# Patient Record
Sex: FEMALE | Race: WHITE | NOT HISPANIC OR LATINO | Employment: OTHER | URBAN - NONMETROPOLITAN AREA
[De-identification: names, ages, dates, MRNs, and addresses within clinical notes are randomized per-mention and may not be internally consistent; named-entity substitution may affect disease eponyms.]

---

## 2017-02-09 ENCOUNTER — OFFICE VISIT (OUTPATIENT)
Dept: OPHTHALMOLOGY | Facility: CLINIC | Age: 72
End: 2017-02-09

## 2017-02-09 DIAGNOSIS — H04.129 DRY EYE: Primary | ICD-10-CM

## 2017-02-09 DIAGNOSIS — Z96.1 PSEUDOPHAKIA: ICD-10-CM

## 2017-02-09 PROCEDURE — 99213 OFFICE O/P EST LOW 20 MIN: CPT | Performed by: OPHTHALMOLOGY

## 2017-02-09 NOTE — PROGRESS NOTES
Jorge Fraser is a 71 y.o. female.   Chief Complaint   Patient presents with   • Dry Eye   • Artificial Lens Present     bilateral       HPI     Dry Eye   In both eyes.  Associated signs and symptoms include eye pain.  Duration of years.  Treatments tried include artificial tears.           Artificial Lens Present    Additional comments: bilateral           Comments   3 month for refraction       Last edited by Jerman Dobbs MD on 2/9/2017  3:39 PM. (History)          Review of Systems   Eyes: Positive for visual disturbance. Negative for pain.       Objective   Visual Acuity (Snellen - Linear)      Right Left   Dist sc 20/50 20/30   Near sc J1 J1            Wearing Rx      Sphere Cylinder Axis Add   Right -1.50 +1.25 003 +2.50   Left -1.50 +0.75 022 2.50           Manifest Refraction      Sphere Cylinder Axis Add   Right -1.50 +1.50 015 +2.50   Left -2.00 +1.00 025 +2.50       Comments:  R 20/30+ L 20/25-            Pupils      Pupils   Right PERRL   Left PERRL           Confrontational Visual Fields     Visual Fields      Left Right   Result Full Full                  Extraocular Movement      Right Left   Result Full, Ortho Full, Ortho              Tonometry (Applanation, 3:42 PM)      Right Left   Pressure 15 15              Main Ophthalmology Exam     External Exam      Right Left    External Normal Normal      Slit Lamp Exam      Right Left    Lids/Lashes Normal Normal    Conjunctiva/Sclera White and quiet White and quiet    Cornea Clear Clear    Anterior Chamber Deep and quiet Deep and quiet    Iris Round and reactive Round and reactive    Vitreous Normal Normal      Fundus Exam      Right Left    Disc Normal Normal    Macula Normal Normal    Vessels Normal Normal    Periphery Normal Normal                Assessment/Plan   Diagnoses and all orders for this visit:    Dry eye    Pseudophakia                Return in about 1 year (around 2/9/2018) for Dilated exam.

## 2023-07-31 ENCOUNTER — PREP FOR SURGERY (OUTPATIENT)
Dept: OTHER | Facility: HOSPITAL | Age: 78
End: 2023-07-31
Payer: MEDICARE

## 2023-07-31 DIAGNOSIS — K92.2 GASTROINTESTINAL BLEEDING: Primary | ICD-10-CM

## 2023-07-31 RX ORDER — SODIUM CHLORIDE 9 MG/ML
40 INJECTION, SOLUTION INTRAVENOUS AS NEEDED
OUTPATIENT
Start: 2023-08-08

## 2023-07-31 RX ORDER — DEXTROSE AND SODIUM CHLORIDE 5; .45 G/100ML; G/100ML
30 INJECTION, SOLUTION INTRAVENOUS CONTINUOUS PRN
OUTPATIENT
Start: 2023-08-08

## 2023-08-04 RX ORDER — ERGOCALCIFEROL 1.25 MG/1
50000 CAPSULE ORAL WEEKLY
COMMUNITY

## 2023-08-04 RX ORDER — MULTIVIT WITH MINERALS/LUTEIN
500 TABLET ORAL DAILY
COMMUNITY

## 2023-08-04 RX ORDER — FERROUS SULFATE 325(65) MG
325 TABLET ORAL NIGHTLY
COMMUNITY

## 2023-08-04 RX ORDER — CALCIUM CARBONATE/VITAMIN D3 500 MG-10
1 TABLET ORAL DAILY
COMMUNITY

## 2023-08-08 ENCOUNTER — ANESTHESIA (OUTPATIENT)
Dept: GASTROENTEROLOGY | Facility: HOSPITAL | Age: 78
End: 2023-08-08
Payer: MEDICARE

## 2023-08-08 ENCOUNTER — APPOINTMENT (OUTPATIENT)
Dept: GENERAL RADIOLOGY | Facility: HOSPITAL | Age: 78
End: 2023-08-08
Payer: MEDICARE

## 2023-08-08 ENCOUNTER — HOSPITAL ENCOUNTER (OUTPATIENT)
Facility: HOSPITAL | Age: 78
Setting detail: HOSPITAL OUTPATIENT SURGERY
Discharge: HOME | End: 2023-08-08
Attending: INTERNAL MEDICINE | Admitting: INTERNAL MEDICINE
Payer: MEDICARE

## 2023-08-08 ENCOUNTER — ANESTHESIA EVENT (OUTPATIENT)
Dept: GASTROENTEROLOGY | Facility: HOSPITAL | Age: 78
End: 2023-08-08
Payer: MEDICARE

## 2023-08-08 VITALS
TEMPERATURE: 96.7 F | WEIGHT: 131.2 LBS | HEIGHT: 63 IN | OXYGEN SATURATION: 100 % | SYSTOLIC BLOOD PRESSURE: 155 MMHG | HEART RATE: 59 BPM | DIASTOLIC BLOOD PRESSURE: 70 MMHG | RESPIRATION RATE: 18 BRPM | BODY MASS INDEX: 23.25 KG/M2

## 2023-08-08 DIAGNOSIS — K92.2 GASTROINTESTINAL BLEEDING: ICD-10-CM

## 2023-08-08 PROCEDURE — 25010000002 PROPOFOL 10 MG/ML EMULSION: Performed by: NURSE ANESTHETIST, CERTIFIED REGISTERED

## 2023-08-08 PROCEDURE — 74018 RADEX ABDOMEN 1 VIEW: CPT

## 2023-08-08 RX ORDER — DEXTROSE AND SODIUM CHLORIDE 5; .45 G/100ML; G/100ML
INJECTION, SOLUTION INTRAVENOUS CONTINUOUS PRN
Status: DISCONTINUED | OUTPATIENT
Start: 2023-08-08 | End: 2023-08-08 | Stop reason: SURG

## 2023-08-08 RX ORDER — ONDANSETRON 2 MG/ML
4 INJECTION INTRAMUSCULAR; INTRAVENOUS ONCE AS NEEDED
Status: DISCONTINUED | OUTPATIENT
Start: 2023-08-08 | End: 2023-08-08 | Stop reason: HOSPADM

## 2023-08-08 RX ORDER — SODIUM CHLORIDE 9 MG/ML
40 INJECTION, SOLUTION INTRAVENOUS AS NEEDED
Status: DISCONTINUED | OUTPATIENT
Start: 2023-08-08 | End: 2023-08-08 | Stop reason: HOSPADM

## 2023-08-08 RX ORDER — PROMETHAZINE HYDROCHLORIDE 25 MG/1
25 SUPPOSITORY RECTAL ONCE AS NEEDED
Status: DISCONTINUED | OUTPATIENT
Start: 2023-08-08 | End: 2023-08-08 | Stop reason: HOSPADM

## 2023-08-08 RX ORDER — PROPOFOL 10 MG/ML
VIAL (ML) INTRAVENOUS AS NEEDED
Status: DISCONTINUED | OUTPATIENT
Start: 2023-08-08 | End: 2023-08-08 | Stop reason: SURG

## 2023-08-08 RX ORDER — PROMETHAZINE HYDROCHLORIDE 25 MG/1
25 TABLET ORAL ONCE AS NEEDED
Status: DISCONTINUED | OUTPATIENT
Start: 2023-08-08 | End: 2023-08-08 | Stop reason: HOSPADM

## 2023-08-08 RX ORDER — DEXTROSE AND SODIUM CHLORIDE 5; .45 G/100ML; G/100ML
30 INJECTION, SOLUTION INTRAVENOUS CONTINUOUS PRN
Status: DISCONTINUED | OUTPATIENT
Start: 2023-08-08 | End: 2023-08-08 | Stop reason: HOSPADM

## 2023-08-08 RX ORDER — LIDOCAINE HYDROCHLORIDE 20 MG/ML
INJECTION, SOLUTION INTRAVENOUS AS NEEDED
Status: DISCONTINUED | OUTPATIENT
Start: 2023-08-08 | End: 2023-08-08 | Stop reason: SURG

## 2023-08-08 RX ADMIN — PROPOFOL 10 MG: 10 INJECTION, EMULSION INTRAVENOUS at 10:04

## 2023-08-08 RX ADMIN — PROPOFOL 20 MG: 10 INJECTION, EMULSION INTRAVENOUS at 09:47

## 2023-08-08 RX ADMIN — DEXTROSE AND SODIUM CHLORIDE: 5; 450 INJECTION, SOLUTION INTRAVENOUS at 09:30

## 2023-08-08 RX ADMIN — PROPOFOL 20 MG: 10 INJECTION, EMULSION INTRAVENOUS at 09:43

## 2023-08-08 RX ADMIN — DEXTROSE AND SODIUM CHLORIDE 30 ML/HR: 5; 450 INJECTION, SOLUTION INTRAVENOUS at 09:35

## 2023-08-08 RX ADMIN — LIDOCAINE HYDROCHLORIDE 50 MG: 20 INJECTION, SOLUTION INTRAVENOUS at 09:40

## 2023-08-08 RX ADMIN — PROPOFOL 10 MG: 10 INJECTION, EMULSION INTRAVENOUS at 09:59

## 2023-08-08 RX ADMIN — PROPOFOL 20 MG: 10 INJECTION, EMULSION INTRAVENOUS at 09:51

## 2023-08-08 RX ADMIN — PROPOFOL 60 MG: 10 INJECTION, EMULSION INTRAVENOUS at 09:40

## 2023-08-08 RX ADMIN — PROPOFOL 20 MG: 10 INJECTION, EMULSION INTRAVENOUS at 09:54

## 2023-08-08 NOTE — ANESTHESIA PREPROCEDURE EVALUATION
Anesthesia Evaluation     NPO Solid Status: > 8 hours  NPO Liquid Status: > 2 hours           Airway   Mallampati: III  TM distance: >3 FB  Neck ROM: full  Possible difficult intubation  Dental    (+) upper dentures and partials    Pulmonary     breath sounds clear to auscultation  Cardiovascular     Rhythm: regular  Rate: normal        Neuro/Psych  GI/Hepatic/Renal/Endo    (+) GI bleeding resolved    Musculoskeletal     Abdominal    Substance History      OB/GYN          Other                        Anesthesia Plan    ASA 2     MAC   total IV anesthesia  intravenous induction     Anesthetic plan, risks, benefits, and alternatives have been provided, discussed and informed consent has been obtained with: patient.      CODE STATUS:

## 2023-08-08 NOTE — ANESTHESIA POSTPROCEDURE EVALUATION
"Patient: Tim Fraser    Procedure Summary       Date: 08/08/23 Room / Location: [facility] ENDOSCOPY 2 / [facility] ENDOSCOPY    Anesthesia Start: 0935 Anesthesia Stop: 1015    Procedures:       ESOPHAGOGASTRODUODENOSCOPY 10:00      COLONOSCOPY 10:00 Diagnosis:       Gastrointestinal bleeding      (Gastrointestinal bleeding [K92.2])    Surgeons: Sigifredo Zapata DO Provider: Rachell Roche CRNA    Anesthesia Type: MAC ASA Status: 2            Anesthesia Type: MAC    Vitals  No vitals data found for the desired time range.          Post Anesthesia Care and Evaluation    Patient location during evaluation: bedside  Patient participation: complete - patient participated  Level of consciousness: awake  Pain score: 0  Pain management: adequate    Airway patency: patent  Anesthetic complications: No anesthetic complications  PONV Status: none  Cardiovascular status: acceptable  Respiratory status: acceptable  Hydration status: acceptable    Comments: /66 (Patient Position: Sitting)   Pulse 67   Temp 97.6 øF (36.4 øC) (Temporal)   Resp 18   Ht 160 cm (63\")   Wt 59.5 kg (131 lb 3.2 oz)   SpO2 100%   BMI 23.24 kg/mý       "

## 2023-08-08 NOTE — H&P
Isa Kimble DO,[facility]  Gastroenterology  Hepatology  Endoscopy  Board Certified in Internal Medicine and gastroenterology  [address], [address]  [place]. [address]  - [phone]   F - [phone]     GASTROENTEROLOGY HISTORY AND PHYSICAL  NOTE   ISA KIMBLE DO.         SUBJECTIVE:   8/8/2023    Name: Tim Fraser  DOD: 1945        Chief Complaint:         Subjective: Occult blood in the stools with chronic anemia.    Patient is 78 y.o. female presents with desire for elective EGD with biopsy as well as colonoscopy.      ROS/HISTORY/ CURRENT MEDICATIONS/OBJECTIVE/VS/PE:   Review of Systems:  All systems unremarkable unless specified below.  Constitutional   HENT  Eyes   Respiratory    Cardiovascular  Gastrointestinal   Endocrine  Genitourinary    Musculoskeletal   Skin  Allergic/Immunologic    Neurological    Hematological  Psychiatric/Behavioral    History:     Past Medical History:   Diagnosis Date    Artificial lens present     in position - both    Cataract     Conjunctivitis     OTHER - RESOLVED    Keratoconjunctivitis sicca     Pain in eye     Presbyopia     Vision disturbance     OTHER, PHOTPSIA    Visual distortions of shape and size     Vitreous detachment of left eye      Past Surgical History:   Procedure Laterality Date    CATARACT EXTRACTION      Remove cataract, insert lens (Cataract extraction with intraocular lens implantation of the right eye)    EYE SURGERY  11/15/2010    AFTER CATARACT LASER SURGERY 52080 (YAG laser capsulotomy, left eye.)     Family History   Problem Relation Age of Onset    Coronary artery disease Other     Heart disease Other     Stroke Other      Social History     Tobacco Use    Smoking status: Never    Smokeless tobacco: Never   Vaping Use    Vaping Use: Never used   Substance Use Topics    Alcohol use: No    Drug use: No     Prior to Admission medications    Medication Sig Start Date End Date Taking? Authorizing Provider   ATENOLOL PO  Take 25 mg by mouth Daily. 1/2 tab in am & 1/2 tab in pm 8/12/16  Yes Shereen Degroot MD   Calcium Carb-Cholecalciferol (Oyster Shell Calcium/D) 500-10 MG-MCG tablet tablet Take 1 tablet by mouth Daily.   Yes Shereen Degroot MD   Cyanocobalamin (VITAMIN B 12 PO) Take 1,000 mg by mouth Daily. SL   Yes Shereen Degroot MD   ferrous sulfate 325 (65 FE) MG tablet Take 1 tablet by mouth Every Night.   Yes Shereen Degroot MD   OMEPRAZOLE PO Take 20 mg by mouth Daily. 8/12/16  Yes Shereen Degroot MD   polyethyl glycol-propyl glycol (SYSTANE) 0.4-0.3 % solution ophthalmic solution (artificial tears) Administer 1 drop to both eyes Daily As Needed.   Yes Shereen Degroot MD   vitamin C (ASCORBIC ACID) 250 MG tablet Take 2 tablets by mouth Daily.   Yes Provider, Historical, MD   vitamin D (ERGOCALCIFEROL) 1.25 MG (34445 UT) capsule capsule Take 1 capsule by mouth 1 (One) Time Per Week.   Yes Shereen Degroot MD     Allergies:  Codeine, Lisinopril, Doxycycline, and Latex    I have reviewed the patients medical history, surgical history and family history in the available medical record system.     Current Medications:     Current Facility-Administered Medications   Medication Dose Route Frequency Provider Last Rate Last Admin    dextrose 5 % and sodium chloride 0.45 % infusion  30 mL/hr Intravenous Continuous PRN Sigifredo Zapata DO        sodium chloride 0.9 % infusion 40 mL  40 mL Intravenous PRN Sigifredo Zapata DO           Objective     Physical Exam:   Temp:  [97.6 øF (36.4 øC)] 97.6 øF (36.4 øC)  Heart Rate:  [67] 67  Resp:  [18] 18  BP: (195)/(84) 195/84    Physical Exam:  General Appearance:    Alert, cooperative, in no acute distress   Head:    Normocephalic, without obvious abnormality, atraumatic   Eyes:            Lids and lashes normal, conjunctivae and sclerae normal, no icterus, no pallor, corneas clear, PERRLA   Ears:    Ears appear intact with no abnormalities noted    Throat:   No oral lesions, no thrush, oral mucosa moist   Neck:   No adenopathy, supple, trachea midline, no thyromegaly, no  carotid bruit, no JVD   Back:     No kyphosis present, no scoliosis present, no skin lesions,   erythema or scars, no tenderness to percussion or                 palpation,  range of motion normal   Lungs:     Clear to auscultation,respirations regular, even and         unlabored    Heart:    Regular rhythm and normal rate, normal S1 and S2, no  murmur, no gallop, no rub, no click   Breast Exam:    Deferred   Abdomen:     Normal bowel sounds, no masses, no organomegaly, soft  nontender, nondistended, no guarding, no rebound                 tenderness   Genitalia:    Deferred   Extremities:   Moves all extremities well, no edema, no cyanosis, no          redness   Pulses:   Pulses palpable and equal bilaterally   Skin:   No bleeding, bruising or rash   Lymph nodes:   No palpable adenopathy   Neurologic:   Cranial nerves 2 - 12 grossly intact, sensation intact, DTR     present and equal bilaterally      Results Review:     Lab Results   Component Value Date    WBC 4.6 08/14/2019    WBC 5.5 09/17/2018    WBC 5.0 03/07/2018    HGB 13.6 08/14/2019    HGB 13.2 09/17/2018    HGB 14.4 03/07/2018    HCT 42.3 08/14/2019    HCT 40.7 09/17/2018    HCT 44.0 (H) 03/07/2018     08/14/2019     09/17/2018     03/07/2018             No results found for: LIPASE  No results found for: INR  No results found for: THROATCX    Radiology Review:  Imaging Results (Last 72 Hours)       ** No results found for the last 72 hours. **             I reviewed the patient's new clinical results.  I reviewed the patient's new imaging results and agree with the interpretation.     ASSESSMENT/PLAN:   ASSESSMENT:  1.  Occult blood in the stools  2.  Iron deficiency anemia  3.  Gastrointestinal bleeding    PLAN:  1.  Esophagogastroduodenoscopy with biopsy as well as colonoscopy    Risk and benefits  associated with the procedure are reviewed with the patient.  The patient wishes to proceed     Sigifredo Zapata DO  08/08/23  09:29 CDT

## 2023-08-09 LAB — REF LAB TEST METHOD: NORMAL

## 2023-08-16 RX ORDER — ATENOLOL 25 MG/1
0.5 TABLET ORAL 2 TIMES DAILY
COMMUNITY
Start: 2023-08-03

## 2023-08-16 RX ORDER — OMEPRAZOLE 20 MG/1
20 CAPSULE, DELAYED RELEASE ORAL DAILY
COMMUNITY
Start: 2023-08-03

## 2023-08-18 ENCOUNTER — OFFICE VISIT (OUTPATIENT)
Dept: SURGERY | Facility: CLINIC | Age: 78
End: 2023-08-18
Payer: MEDICARE

## 2023-08-18 VITALS
WEIGHT: 132 LBS | BODY MASS INDEX: 23.39 KG/M2 | HEART RATE: 69 BPM | HEIGHT: 63 IN | SYSTOLIC BLOOD PRESSURE: 134 MMHG | TEMPERATURE: 96.8 F | DIASTOLIC BLOOD PRESSURE: 72 MMHG

## 2023-08-18 DIAGNOSIS — C18.4 MALIGNANT NEOPLASM OF TRANSVERSE COLON: Primary | ICD-10-CM

## 2023-08-18 PROCEDURE — 1160F RVW MEDS BY RX/DR IN RCRD: CPT | Performed by: SURGERY

## 2023-08-18 PROCEDURE — 1159F MED LIST DOCD IN RCRD: CPT | Performed by: SURGERY

## 2023-08-18 PROCEDURE — 99204 OFFICE O/P NEW MOD 45 MIN: CPT | Performed by: SURGERY

## 2023-08-18 RX ORDER — SCOLOPAMINE TRANSDERMAL SYSTEM 1 MG/1
1 PATCH, EXTENDED RELEASE TRANSDERMAL CONTINUOUS
Status: CANCELLED | OUTPATIENT
Start: 2023-08-22 | End: 2023-08-25

## 2023-08-18 RX ORDER — ACETAMINOPHEN 500 MG
1000 TABLET ORAL EVERY 6 HOURS
Status: CANCELLED | OUTPATIENT
Start: 2023-08-22

## 2023-08-18 RX ORDER — MELOXICAM 15 MG/1
15 TABLET ORAL ONCE
Status: CANCELLED | OUTPATIENT
Start: 2023-08-22 | End: 2023-08-18

## 2023-08-18 RX ORDER — CHLORHEXIDINE GLUCONATE 4 G/100ML
SOLUTION TOPICAL 2 TIMES DAILY
Qty: 236 ML | Refills: 0 | Status: ON HOLD | OUTPATIENT
Start: 2023-08-18 | End: 2023-08-22

## 2023-08-18 RX ORDER — ALVIMOPAN 12 MG/1
12 CAPSULE ORAL ONCE
Status: CANCELLED | OUTPATIENT
Start: 2023-08-22 | End: 2023-08-25

## 2023-08-18 RX ORDER — GABAPENTIN 400 MG/1
400 CAPSULE ORAL ONCE
Status: CANCELLED | OUTPATIENT
Start: 2023-08-22

## 2023-08-18 NOTE — PROGRESS NOTES
Jorge Fraser is a 78 y.o. female     Chief Complaint: Distal transverse colon cancer    History of Present Illness referred by Dr. Zapata for surgery after work-up demonstrated that she had a distal transverse colon cancer.  Patient undergone EGD and a colonoscopy by Dr. Zapata for history of iron deficiency anemia.  Colon cancer was moderate differentiated adenocarcinoma MSI status was stable and intact.  Dr. Zapata did not get proximal to the lesion.  He did an x-ray of the abdomen with the scope in place which demonstrated this to be in the distal transverse colon just proximal to the splenic flexure.  He tattooed the lesion.  CT scan from [facility] confirmed this finding and did not suggest any metastatic disease.  CEA was 5.16 and she is not a smoker she is never smoked.  LFTs are all within normal limits.  Hemoglobin is 9.3.  Patient is very active.  When hemoglobin got lower she was somewhat felt weaker but otherwise no symptoms.  No abdominal pain no obstructive type symptoms appreciated.  She says has been mildly constipated since the colonoscopy was done but able to have bowel movements.  No prior colonoscopies in the past.  EGD was negative for any obvious ulcers or significant bleeding.  Fundic polyp was biopsied.  Patient not had any prior abdominal surgery.  She is present here with her 2 daughter-in-law's.  No family history of colon cancer    Review of Systems   Constitutional:  Positive for fatigue and unexpected weight change.        Loss   HENT:  Positive for hearing loss.    Eyes: Negative.    Respiratory:  Positive for cough.    Cardiovascular: Negative.    Gastrointestinal:  Positive for blood in stool and constipation.   Endocrine: Negative.    Genitourinary:  Positive for frequency and urgency.        UTI   Musculoskeletal:  Positive for arthralgias.   Allergic/Immunologic: Negative.    Neurological: Negative.    Hematological: Negative.    Psychiatric/Behavioral: Negative.    "  Past Medical History:   Diagnosis Date    Artificial lens present     in position - both    Cataract     Conjunctivitis     OTHER - RESOLVED    Keratoconjunctivitis sicca     Pain in eye     Presbyopia     Vision disturbance     OTHER, PHOTPSIA    Visual distortions of shape and size     Vitreous detachment of left eye      Past Surgical History:   Procedure Laterality Date    CATARACT EXTRACTION      Remove cataract, insert lens (Cataract extraction with intraocular lens implantation of the right eye)    COLONOSCOPY N/A 8/8/2023    Procedure: COLONOSCOPY 10:00;  Surgeon: Sigifredo Zapata DO;  Location: [facility] ENDOSCOPY;  Service: Gastroenterology;  Laterality: N/A;  tatoo at transverse mass    ENDOSCOPY N/A 8/8/2023    Procedure: ESOPHAGOGASTRODUODENOSCOPY 10:00;  Surgeon: Sigifredo Zapata DO;  Location: [facility] ENDOSCOPY;  Service: Gastroenterology;  Laterality: N/A;    EYE SURGERY  11/15/2010    AFTER CATARACT LASER SURGERY 17767 (YAG laser capsulotomy, left eye.)     Family History   Problem Relation Age of Onset    Coronary artery disease Other     Heart disease Other     Stroke Other      Social History     Socioeconomic History    Marital status:    Tobacco Use    Smoking status: Never    Smokeless tobacco: Never   Vaping Use    Vaping Use: Never used   Substance and Sexual Activity    Alcohol use: No    Drug use: No     Allergies   Allergen Reactions    Codeine Other (See Comments)     \"Causes chest pressure\"    Doxycycline Rash    Latex Rash    Lisinopril Cough     Vitals:    08/18/23 1441   BP: 134/72   Pulse: 69   Temp: 96.8 øF (36 øC)       Home Medications:  Prior to Admission medications    Medication Sig Start Date End Date Taking? Authorizing Provider   atenolol (TENORMIN) 25 MG tablet Take 0.5 tablets by mouth 2 (Two) Times a Day. 8/3/23  Yes Provider, MD Shereen   Calcium Carb-Cholecalciferol (Oyster Shell Calcium/D) 500-10 MG-MCG tablet tablet Take 1 tablet by mouth Daily.   Yes " Shereen Degroot MD   Cyanocobalamin (VITAMIN B 12 PO) Take 1,000 mg by mouth Daily. SL   Yes Shereen Degroot MD   ferrous sulfate 325 (65 FE) MG tablet Take 1 tablet by mouth Every Night.   Yes Shereen Degroot MD   omeprazole (priLOSEC) 20 MG capsule Take 1 capsule by mouth Daily. 8/3/23  Yes Shereen Degroot MD   polyethyl glycol-propyl glycol (SYSTANE) 0.4-0.3 % solution ophthalmic solution (artificial tears) Administer 1 drop to both eyes Daily As Needed.   Yes Shereen Degroot MD   vitamin C (ASCORBIC ACID) 250 MG tablet Take 2 tablets by mouth Daily.   Yes Shereen Degroot MD   vitamin D (ERGOCALCIFEROL) 1.25 MG (76073 UT) capsule capsule Take 1 capsule by mouth 1 (One) Time Per Week.   Yes Shereen Degroot MD       Objective   Physical Exam  Constitutional:       General: She is not in acute distress.     Appearance: She is well-developed.   HENT:      Head: Normocephalic and atraumatic.   Eyes:      General: No scleral icterus.     Conjunctiva/sclera: Conjunctivae normal.   Neck:      Thyroid: No thyromegaly.      Trachea: No tracheal deviation.   Cardiovascular:      Rate and Rhythm: Normal rate and regular rhythm.      Heart sounds: Normal heart sounds. No murmur heard.    No friction rub. No gallop.   Pulmonary:      Effort: Pulmonary effort is normal. No respiratory distress.      Breath sounds: Normal breath sounds. No stridor. No wheezing or rales.   Chest:      Chest wall: No tenderness.   Abdominal:      General: Bowel sounds are normal. There is no distension.      Palpations: Abdomen is soft. There is no mass.      Tenderness: There is no abdominal tenderness. There is no guarding or rebound.      Hernia: No hernia is present.   Musculoskeletal:         General: No deformity. Normal range of motion.      Cervical back: Normal range of motion and neck supple.   Lymphadenopathy:      Cervical: No cervical adenopathy.   Skin:     General: Skin is warm and dry.       Coloration: Skin is not pale.      Findings: No erythema or rash.      Nails: There is no clubbing.   Neurological:      Mental Status: She is alert and oriented to person, place, and time.   Psychiatric:         Behavior: Behavior normal.         Thought Content: Thought content normal.       Assessment & Plan distal transverse colon cancer.  Recommend laparoscopic resection possible open procedure.  Fully discussed the procedure alternatives risk and benefits the patient clearly understands and wishes to proceed.  We will place her on ERAS protocol.      The encounter diagnosis was Malignant neoplasm of transverse colon.                     This document has been electronically signed by Sarmad Bateman MD on August 18, 2023 16:40 CDT

## 2023-08-18 NOTE — H&P (VIEW-ONLY)
Jorge Fraser is a 78 y.o. female     Chief Complaint: Distal transverse colon cancer    History of Present Illness referred by Dr. Zapata for surgery after work-up demonstrated that she had a distal transverse colon cancer.  Patient undergone EGD and a colonoscopy by Dr. Zapata for history of iron deficiency anemia.  Colon cancer was moderate differentiated adenocarcinoma MSI status was stable and intact.  Dr. Zapata did not get proximal to the lesion.  He did an x-ray of the abdomen with the scope in place which demonstrated this to be in the distal transverse colon just proximal to the splenic flexure.  He tattooed the lesion.  CT scan from [facility] confirmed this finding and did not suggest any metastatic disease.  CEA was 5.16 and she is not a smoker she is never smoked.  LFTs are all within normal limits.  Hemoglobin is 9.3.  Patient is very active.  When hemoglobin got lower she was somewhat felt weaker but otherwise no symptoms.  No abdominal pain no obstructive type symptoms appreciated.  She says has been mildly constipated since the colonoscopy was done but able to have bowel movements.  No prior colonoscopies in the past.  EGD was negative for any obvious ulcers or significant bleeding.  Fundic polyp was biopsied.  Patient not had any prior abdominal surgery.  She is present here with her 2 daughter-in-law's.  No family history of colon cancer    Review of Systems   Constitutional:  Positive for fatigue and unexpected weight change.        Loss   HENT:  Positive for hearing loss.    Eyes: Negative.    Respiratory:  Positive for cough.    Cardiovascular: Negative.    Gastrointestinal:  Positive for blood in stool and constipation.   Endocrine: Negative.    Genitourinary:  Positive for frequency and urgency.        UTI   Musculoskeletal:  Positive for arthralgias.   Allergic/Immunologic: Negative.    Neurological: Negative.    Hematological: Negative.    Psychiatric/Behavioral: Negative.    "  Past Medical History:   Diagnosis Date    Artificial lens present     in position - both    Cataract     Conjunctivitis     OTHER - RESOLVED    Keratoconjunctivitis sicca     Pain in eye     Presbyopia     Vision disturbance     OTHER, PHOTPSIA    Visual distortions of shape and size     Vitreous detachment of left eye      Past Surgical History:   Procedure Laterality Date    CATARACT EXTRACTION      Remove cataract, insert lens (Cataract extraction with intraocular lens implantation of the right eye)    COLONOSCOPY N/A 8/8/2023    Procedure: COLONOSCOPY 10:00;  Surgeon: Sigifredo Zapata DO;  Location: [facility] ENDOSCOPY;  Service: Gastroenterology;  Laterality: N/A;  tatoo at transverse mass    ENDOSCOPY N/A 8/8/2023    Procedure: ESOPHAGOGASTRODUODENOSCOPY 10:00;  Surgeon: Sigifredo Zapata DO;  Location: [facility] ENDOSCOPY;  Service: Gastroenterology;  Laterality: N/A;    EYE SURGERY  11/15/2010    AFTER CATARACT LASER SURGERY 61861 (YAG laser capsulotomy, left eye.)     Family History   Problem Relation Age of Onset    Coronary artery disease Other     Heart disease Other     Stroke Other      Social History     Socioeconomic History    Marital status:    Tobacco Use    Smoking status: Never    Smokeless tobacco: Never   Vaping Use    Vaping Use: Never used   Substance and Sexual Activity    Alcohol use: No    Drug use: No     Allergies   Allergen Reactions    Codeine Other (See Comments)     \"Causes chest pressure\"    Doxycycline Rash    Latex Rash    Lisinopril Cough     Vitals:    08/18/23 1441   BP: 134/72   Pulse: 69   Temp: 96.8 °F (36 °C)       Home Medications:  Prior to Admission medications    Medication Sig Start Date End Date Taking? Authorizing Provider   atenolol (TENORMIN) 25 MG tablet Take 0.5 tablets by mouth 2 (Two) Times a Day. 8/3/23  Yes Provider, MD Shereen   Calcium Carb-Cholecalciferol (Oyster Shell Calcium/D) 500-10 MG-MCG tablet tablet Take 1 tablet by mouth Daily.   Yes " Shereen Degroot MD   Cyanocobalamin (VITAMIN B 12 PO) Take 1,000 mg by mouth Daily. SL   Yes Shereen Degroot MD   ferrous sulfate 325 (65 FE) MG tablet Take 1 tablet by mouth Every Night.   Yes Shereen Degroot MD   omeprazole (priLOSEC) 20 MG capsule Take 1 capsule by mouth Daily. 8/3/23  Yes Shereen Degroot MD   polyethyl glycol-propyl glycol (SYSTANE) 0.4-0.3 % solution ophthalmic solution (artificial tears) Administer 1 drop to both eyes Daily As Needed.   Yes Shereen Degroot MD   vitamin C (ASCORBIC ACID) 250 MG tablet Take 2 tablets by mouth Daily.   Yes Shereen Degroot MD   vitamin D (ERGOCALCIFEROL) 1.25 MG (08748 UT) capsule capsule Take 1 capsule by mouth 1 (One) Time Per Week.   Yes Shereen Degroot MD       Objective   Physical Exam  Constitutional:       General: She is not in acute distress.     Appearance: She is well-developed.   HENT:      Head: Normocephalic and atraumatic.   Eyes:      General: No scleral icterus.     Conjunctiva/sclera: Conjunctivae normal.   Neck:      Thyroid: No thyromegaly.      Trachea: No tracheal deviation.   Cardiovascular:      Rate and Rhythm: Normal rate and regular rhythm.      Heart sounds: Normal heart sounds. No murmur heard.    No friction rub. No gallop.   Pulmonary:      Effort: Pulmonary effort is normal. No respiratory distress.      Breath sounds: Normal breath sounds. No stridor. No wheezing or rales.   Chest:      Chest wall: No tenderness.   Abdominal:      General: Bowel sounds are normal. There is no distension.      Palpations: Abdomen is soft. There is no mass.      Tenderness: There is no abdominal tenderness. There is no guarding or rebound.      Hernia: No hernia is present.   Musculoskeletal:         General: No deformity. Normal range of motion.      Cervical back: Normal range of motion and neck supple.   Lymphadenopathy:      Cervical: No cervical adenopathy.   Skin:     General: Skin is warm and dry.       Coloration: Skin is not pale.      Findings: No erythema or rash.      Nails: There is no clubbing.   Neurological:      Mental Status: She is alert and oriented to person, place, and time.   Psychiatric:         Behavior: Behavior normal.         Thought Content: Thought content normal.       Assessment & Plan distal transverse colon cancer.  Recommend laparoscopic resection possible open procedure.  Fully discussed the procedure alternatives risk and benefits the patient clearly understands and wishes to proceed.  We will place her on ERAS protocol.      The encounter diagnosis was Malignant neoplasm of transverse colon.                     This document has been electronically signed by Sarmad Bateman MD on August 18, 2023 16:40 CDT

## 2023-08-21 ENCOUNTER — PRE-ADMISSION TESTING (OUTPATIENT)
Dept: PREADMISSION TESTING | Facility: HOSPITAL | Age: 78
DRG: 331 | End: 2023-08-21
Payer: MEDICARE

## 2023-08-21 ENCOUNTER — HOSPITAL ENCOUNTER (OUTPATIENT)
Dept: GENERAL RADIOLOGY | Facility: HOSPITAL | Age: 78
Discharge: HOME | End: 2023-08-21
Payer: MEDICARE

## 2023-08-21 DIAGNOSIS — C18.4 MALIGNANT NEOPLASM OF TRANSVERSE COLON: ICD-10-CM

## 2023-08-21 LAB
ABO GROUP BLD: NORMAL
ALBUMIN SERPL-MCNC: 3.5 G/DL (ref 3.5–5.2)
ALBUMIN/GLOB SERPL: 1.3 G/DL
ALP SERPL-CCNC: 59 U/L (ref 39–117)
ALT SERPL W P-5'-P-CCNC: 9 U/L (ref 1–33)
ANION GAP SERPL CALCULATED.3IONS-SCNC: 9 MMOL/L (ref 5–15)
AST SERPL-CCNC: 12 U/L (ref 1–32)
BILIRUB SERPL-MCNC: 0.3 MG/DL (ref 0–1.2)
BLD GP AB SCN SERPL QL: NEGATIVE
BUN SERPL-MCNC: 9 MG/DL (ref 8–23)
BUN/CREAT SERPL: 9.6 (ref 7–25)
CALCIUM SPEC-SCNC: 8.3 MG/DL (ref 8.6–10.5)
CHLORIDE SERPL-SCNC: 105 MMOL/L (ref 98–107)
CO2 SERPL-SCNC: 24 MMOL/L (ref 22–29)
CREAT SERPL-MCNC: 0.94 MG/DL (ref 0.57–1)
DEPRECATED RDW RBC AUTO: 42.3 FL (ref 37–54)
EGFRCR SERPLBLD CKD-EPI 2021: 62.2 ML/MIN/1.73
ERYTHROCYTE [DISTWIDTH] IN BLOOD BY AUTOMATED COUNT: 13.3 % (ref 12.3–15.4)
GLOBULIN UR ELPH-MCNC: 2.6 GM/DL
GLUCOSE SERPL-MCNC: 102 MG/DL (ref 65–99)
HCT VFR BLD AUTO: 28.2 % (ref 34–46.6)
HGB BLD-MCNC: 8.5 G/DL (ref 12–15.9)
Lab: NORMAL
MCH RBC QN AUTO: 26.2 PG (ref 26.6–33)
MCHC RBC AUTO-ENTMCNC: 30.1 G/DL (ref 31.5–35.7)
MCV RBC AUTO: 86.8 FL (ref 79–97)
PLATELET # BLD AUTO: 368 10*3/MM3 (ref 140–450)
PMV BLD AUTO: 9.9 FL (ref 6–12)
POTASSIUM SERPL-SCNC: 4.2 MMOL/L (ref 3.5–5.2)
PROT SERPL-MCNC: 6.1 G/DL (ref 6–8.5)
RBC # BLD AUTO: 3.25 10*6/MM3 (ref 3.77–5.28)
RH BLD: POSITIVE
SODIUM SERPL-SCNC: 138 MMOL/L (ref 136–145)
T&S EXPIRATION DATE: NORMAL
WBC NRBC COR # BLD: 4.29 10*3/MM3 (ref 3.4–10.8)

## 2023-08-21 PROCEDURE — 86900 BLOOD TYPING SEROLOGIC ABO: CPT

## 2023-08-21 PROCEDURE — 85027 COMPLETE CBC AUTOMATED: CPT

## 2023-08-21 PROCEDURE — 93005 ELECTROCARDIOGRAM TRACING: CPT

## 2023-08-21 PROCEDURE — 93010 ELECTROCARDIOGRAM REPORT: CPT | Performed by: INTERNAL MEDICINE

## 2023-08-21 PROCEDURE — 80053 COMPREHEN METABOLIC PANEL: CPT

## 2023-08-21 PROCEDURE — 71046 X-RAY EXAM CHEST 2 VIEWS: CPT

## 2023-08-21 PROCEDURE — 36415 COLL VENOUS BLD VENIPUNCTURE: CPT

## 2023-08-21 PROCEDURE — 86850 RBC ANTIBODY SCREEN: CPT

## 2023-08-21 PROCEDURE — 86901 BLOOD TYPING SEROLOGIC RH(D): CPT

## 2023-08-21 RX ORDER — SODIUM CHLORIDE, SODIUM GLUCONATE, SODIUM ACETATE, POTASSIUM CHLORIDE AND MAGNESIUM CHLORIDE 526; 502; 368; 37; 30 MG/100ML; MG/100ML; MG/100ML; MG/100ML; MG/100ML
1000 INJECTION, SOLUTION INTRAVENOUS CONTINUOUS PRN
Status: CANCELLED | OUTPATIENT
Start: 2023-08-22

## 2023-08-21 NOTE — PAT
"MESSAGE SENT TO DOCTOR COLEMAN RELATED TO CBC RESULTS. ALSO, PALOMA HENSON AWARE AND STATED \"WILL LET HIM KNOW.  "

## 2023-08-22 ENCOUNTER — ANESTHESIA EVENT (OUTPATIENT)
Dept: PERIOP | Facility: HOSPITAL | Age: 78
DRG: 331 | End: 2023-08-22
Payer: MEDICARE

## 2023-08-22 ENCOUNTER — HOSPITAL ENCOUNTER (INPATIENT)
Facility: HOSPITAL | Age: 78
LOS: 3 days | Discharge: HOME | DRG: 331 | End: 2023-08-25
Attending: SURGERY | Admitting: SURGERY
Payer: MEDICARE

## 2023-08-22 ENCOUNTER — ANESTHESIA (OUTPATIENT)
Dept: PERIOP | Facility: HOSPITAL | Age: 78
DRG: 331 | End: 2023-08-22
Payer: MEDICARE

## 2023-08-22 DIAGNOSIS — Z74.09 IMPAIRED FUNCTIONAL MOBILITY, BALANCE, GAIT, AND ENDURANCE: ICD-10-CM

## 2023-08-22 DIAGNOSIS — Z74.09 IMPAIRED MOBILITY AND ADLS: Primary | ICD-10-CM

## 2023-08-22 DIAGNOSIS — Z78.9 IMPAIRED MOBILITY AND ADLS: Primary | ICD-10-CM

## 2023-08-22 DIAGNOSIS — C18.4 MALIGNANT NEOPLASM OF TRANSVERSE COLON: ICD-10-CM

## 2023-08-22 PROBLEM — C18.9 COLON CANCER: Status: ACTIVE | Noted: 2023-08-22

## 2023-08-22 LAB
ABO GROUP BLD: NORMAL
ANISOCYTOSIS BLD QL: NORMAL
BLD GP AB SCN SERPL QL: NEGATIVE
DEPRECATED RDW RBC AUTO: 41.1 FL (ref 37–54)
ERYTHROCYTE [DISTWIDTH] IN BLOOD BY AUTOMATED COUNT: 13.2 % (ref 12.3–15.4)
HCT VFR BLD AUTO: 21.9 % (ref 34–46.6)
HGB BLD-MCNC: 6.8 G/DL (ref 12–15.9)
Lab: NORMAL
MCH RBC QN AUTO: 27 PG (ref 26.6–33)
MCHC RBC AUTO-ENTMCNC: 31.1 G/DL (ref 31.5–35.7)
MCV RBC AUTO: 86.9 FL (ref 79–97)
OVALOCYTES BLD QL SMEAR: NORMAL
PLATELET # BLD AUTO: 268 10*3/MM3 (ref 140–450)
PMV BLD AUTO: 10 FL (ref 6–12)
RBC # BLD AUTO: 2.52 10*6/MM3 (ref 3.77–5.28)
RH BLD: POSITIVE
SMALL PLATELETS BLD QL SMEAR: ADEQUATE
T&S EXPIRATION DATE: NORMAL
WBC MORPH BLD: NORMAL
WBC NRBC COR # BLD: 7.25 10*3/MM3 (ref 3.4–10.8)

## 2023-08-22 PROCEDURE — 86850 RBC ANTIBODY SCREEN: CPT | Performed by: ANESTHESIOLOGY

## 2023-08-22 PROCEDURE — 88331 PATH CONSLTJ SURG 1 BLK 1SPC: CPT

## 2023-08-22 PROCEDURE — P9041 ALBUMIN (HUMAN),5%, 50ML: HCPCS | Performed by: NURSE ANESTHETIST, CERTIFIED REGISTERED

## 2023-08-22 PROCEDURE — 25010000002 ALBUMIN HUMAN 5% PER 50 ML: Performed by: NURSE ANESTHETIST, CERTIFIED REGISTERED

## 2023-08-22 PROCEDURE — 86900 BLOOD TYPING SEROLOGIC ABO: CPT | Performed by: ANESTHESIOLOGY

## 2023-08-22 PROCEDURE — 44140 PARTIAL REMOVAL OF COLON: CPT | Performed by: SURGERY

## 2023-08-22 PROCEDURE — 25010000002 PROPOFOL 10 MG/ML EMULSION: Performed by: NURSE ANESTHETIST, CERTIFIED REGISTERED

## 2023-08-22 PROCEDURE — 86923 COMPATIBILITY TEST ELECTRIC: CPT

## 2023-08-22 PROCEDURE — 25010000002 ONDANSETRON PER 1 MG: Performed by: SURGERY

## 2023-08-22 PROCEDURE — 86901 BLOOD TYPING SEROLOGIC RH(D): CPT | Performed by: ANESTHESIOLOGY

## 2023-08-22 PROCEDURE — C9290 INJ, BUPIVACAINE LIPOSOME: HCPCS | Performed by: SURGERY

## 2023-08-22 PROCEDURE — 25010000002 DEXAMETHASONE PER 1 MG: Performed by: NURSE ANESTHETIST, CERTIFIED REGISTERED

## 2023-08-22 PROCEDURE — 86900 BLOOD TYPING SEROLOGIC ABO: CPT

## 2023-08-22 PROCEDURE — 25010000002 ONDANSETRON PER 1 MG: Performed by: NURSE ANESTHETIST, CERTIFIED REGISTERED

## 2023-08-22 PROCEDURE — 44140 PARTIAL REMOVAL OF COLON: CPT | Performed by: SPECIALIST/TECHNOLOGIST, OTHER

## 2023-08-22 PROCEDURE — 0 BUPIVACAINE LIPOSOME 1.3 % SUSPENSION: Performed by: SURGERY

## 2023-08-22 PROCEDURE — 36430 TRANSFUSION BLD/BLD COMPNT: CPT

## 2023-08-22 PROCEDURE — 88309 TISSUE EXAM BY PATHOLOGIST: CPT

## 2023-08-22 PROCEDURE — 88342 IMHCHEM/IMCYTCHM 1ST ANTB: CPT

## 2023-08-22 PROCEDURE — 25010000002 SUGAMMADEX 200 MG/2ML SOLUTION: Performed by: NURSE ANESTHETIST, CERTIFIED REGISTERED

## 2023-08-22 PROCEDURE — 25010000002 CEFOXITIN PER 1 G: Performed by: SURGERY

## 2023-08-22 PROCEDURE — 25010000002 FENTANYL CITRATE (PF) 100 MCG/2ML SOLUTION: Performed by: NURSE ANESTHETIST, CERTIFIED REGISTERED

## 2023-08-22 PROCEDURE — 25010000002 MIDAZOLAM PER 1 MG: Performed by: NURSE ANESTHETIST, CERTIFIED REGISTERED

## 2023-08-22 PROCEDURE — 85027 COMPLETE CBC AUTOMATED: CPT | Performed by: SURGERY

## 2023-08-22 PROCEDURE — 0DJD4ZZ INSPECTION OF LOWER INTESTINAL TRACT, PERCUTANEOUS ENDOSCOPIC APPROACH: ICD-10-PCS | Performed by: SURGERY

## 2023-08-22 PROCEDURE — 25010000002 HYDROMORPHONE 1 MG/ML SOLUTION: Performed by: NURSE ANESTHETIST, CERTIFIED REGISTERED

## 2023-08-22 PROCEDURE — 85007 BL SMEAR W/DIFF WBC COUNT: CPT | Performed by: SURGERY

## 2023-08-22 PROCEDURE — 0DBG0ZZ EXCISION OF LEFT LARGE INTESTINE, OPEN APPROACH: ICD-10-PCS | Performed by: SURGERY

## 2023-08-22 PROCEDURE — P9016 RBC LEUKOCYTES REDUCED: HCPCS

## 2023-08-22 PROCEDURE — 25010000002 ONDANSETRON PER 1 MG

## 2023-08-22 DEVICE — PROXIMATE RELOADABLE LINEAR CUTTER WITH SAFETY LOCK-OUT, 75MM
Type: IMPLANTABLE DEVICE | Site: ABDOMEN | Status: FUNCTIONAL
Brand: PROXIMATE

## 2023-08-22 RX ORDER — NALOXONE HCL 0.4 MG/ML
0.4 VIAL (ML) INJECTION
Status: DISCONTINUED | OUTPATIENT
Start: 2023-08-22 | End: 2023-08-25 | Stop reason: HOSPADM

## 2023-08-22 RX ORDER — ACETAMINOPHEN 500 MG
1000 TABLET ORAL EVERY 6 HOURS
Status: DISCONTINUED | OUTPATIENT
Start: 2023-08-22 | End: 2023-08-22 | Stop reason: HOSPADM

## 2023-08-22 RX ORDER — FAMOTIDINE 20 MG/1
20 TABLET, FILM COATED ORAL 2 TIMES DAILY
Status: DISCONTINUED | OUTPATIENT
Start: 2023-08-22 | End: 2023-08-25 | Stop reason: HOSPADM

## 2023-08-22 RX ORDER — SCOLOPAMINE TRANSDERMAL SYSTEM 1 MG/1
1 PATCH, EXTENDED RELEASE TRANSDERMAL CONTINUOUS
Status: DISCONTINUED | OUTPATIENT
Start: 2023-08-22 | End: 2023-08-22

## 2023-08-22 RX ORDER — MIDAZOLAM HYDROCHLORIDE 1 MG/ML
INJECTION INTRAMUSCULAR; INTRAVENOUS AS NEEDED
Status: DISCONTINUED | OUTPATIENT
Start: 2023-08-22 | End: 2023-08-22 | Stop reason: SURG

## 2023-08-22 RX ORDER — HEPARIN SODIUM 5000 [USP'U]/ML
5000 INJECTION, SOLUTION INTRAVENOUS; SUBCUTANEOUS EVERY 12 HOURS SCHEDULED
Status: DISCONTINUED | OUTPATIENT
Start: 2023-08-23 | End: 2023-08-22

## 2023-08-22 RX ORDER — ONDANSETRON 2 MG/ML
INJECTION INTRAMUSCULAR; INTRAVENOUS AS NEEDED
Status: DISCONTINUED | OUTPATIENT
Start: 2023-08-22 | End: 2023-08-22 | Stop reason: SURG

## 2023-08-22 RX ORDER — MELOXICAM 15 MG/1
15 TABLET ORAL ONCE
Status: COMPLETED | OUTPATIENT
Start: 2023-08-22 | End: 2023-08-22

## 2023-08-22 RX ORDER — ALBUMIN, HUMAN INJ 5% 5 %
SOLUTION INTRAVENOUS CONTINUOUS PRN
Status: DISCONTINUED | OUTPATIENT
Start: 2023-08-22 | End: 2023-08-22 | Stop reason: SURG

## 2023-08-22 RX ORDER — VECURONIUM BROMIDE 1 MG/ML
INJECTION, POWDER, LYOPHILIZED, FOR SOLUTION INTRAVENOUS AS NEEDED
Status: DISCONTINUED | OUTPATIENT
Start: 2023-08-22 | End: 2023-08-22 | Stop reason: SURG

## 2023-08-22 RX ORDER — ONDANSETRON 2 MG/ML
4 INJECTION INTRAMUSCULAR; INTRAVENOUS ONCE
Status: COMPLETED | OUTPATIENT
Start: 2023-08-22 | End: 2023-08-22

## 2023-08-22 RX ORDER — LIDOCAINE HYDROCHLORIDE 20 MG/ML
INJECTION, SOLUTION EPIDURAL; INFILTRATION; INTRACAUDAL; PERINEURAL AS NEEDED
Status: DISCONTINUED | OUTPATIENT
Start: 2023-08-22 | End: 2023-08-22 | Stop reason: SURG

## 2023-08-22 RX ORDER — DEXTROSE MONOHYDRATE, SODIUM CHLORIDE, AND POTASSIUM CHLORIDE 50; 1.49; 4.5 G/1000ML; G/1000ML; G/1000ML
50 INJECTION, SOLUTION INTRAVENOUS CONTINUOUS
Status: DISCONTINUED | OUTPATIENT
Start: 2023-08-22 | End: 2023-08-25

## 2023-08-22 RX ORDER — OXYCODONE HYDROCHLORIDE 5 MG/1
5 TABLET ORAL EVERY 4 HOURS PRN
Status: DISCONTINUED | OUTPATIENT
Start: 2023-08-22 | End: 2023-08-25 | Stop reason: HOSPADM

## 2023-08-22 RX ORDER — SODIUM CHLORIDE, SODIUM GLUCONATE, SODIUM ACETATE, POTASSIUM CHLORIDE AND MAGNESIUM CHLORIDE 526; 502; 368; 37; 30 MG/100ML; MG/100ML; MG/100ML; MG/100ML; MG/100ML
1000 INJECTION, SOLUTION INTRAVENOUS CONTINUOUS PRN
Status: DISCONTINUED | OUTPATIENT
Start: 2023-08-22 | End: 2023-08-25 | Stop reason: HOSPADM

## 2023-08-22 RX ORDER — EPHEDRINE SULFATE 50 MG/ML
INJECTION INTRAVENOUS AS NEEDED
Status: DISCONTINUED | OUTPATIENT
Start: 2023-08-22 | End: 2023-08-22 | Stop reason: SURG

## 2023-08-22 RX ORDER — NITROGLYCERIN 0.4 MG/1
0.4 TABLET SUBLINGUAL
Status: DISCONTINUED | OUTPATIENT
Start: 2023-08-22 | End: 2023-08-25 | Stop reason: HOSPADM

## 2023-08-22 RX ORDER — ONDANSETRON 2 MG/ML
4 INJECTION INTRAMUSCULAR; INTRAVENOUS EVERY 6 HOURS PRN
Status: DISCONTINUED | OUTPATIENT
Start: 2023-08-22 | End: 2023-08-25 | Stop reason: HOSPADM

## 2023-08-22 RX ORDER — ALVIMOPAN 12 MG/1
12 CAPSULE ORAL ONCE
Status: COMPLETED | OUTPATIENT
Start: 2023-08-22 | End: 2023-08-22

## 2023-08-22 RX ORDER — DEXAMETHASONE SODIUM PHOSPHATE 4 MG/ML
INJECTION, SOLUTION INTRA-ARTICULAR; INTRALESIONAL; INTRAMUSCULAR; INTRAVENOUS; SOFT TISSUE AS NEEDED
Status: DISCONTINUED | OUTPATIENT
Start: 2023-08-22 | End: 2023-08-22 | Stop reason: SURG

## 2023-08-22 RX ORDER — ACETAMINOPHEN 500 MG
1000 TABLET ORAL EVERY 6 HOURS
Status: DISPENSED | OUTPATIENT
Start: 2023-08-22 | End: 2023-08-24

## 2023-08-22 RX ORDER — PROPOFOL 10 MG/ML
VIAL (ML) INTRAVENOUS AS NEEDED
Status: DISCONTINUED | OUTPATIENT
Start: 2023-08-22 | End: 2023-08-22 | Stop reason: SURG

## 2023-08-22 RX ORDER — ALVIMOPAN 12 MG/1
12 CAPSULE ORAL 2 TIMES DAILY
Status: DISCONTINUED | OUTPATIENT
Start: 2023-08-23 | End: 2023-08-24

## 2023-08-22 RX ORDER — ONDANSETRON 2 MG/ML
4 INJECTION INTRAMUSCULAR; INTRAVENOUS ONCE AS NEEDED
Status: DISCONTINUED | OUTPATIENT
Start: 2023-08-22 | End: 2023-08-22 | Stop reason: HOSPADM

## 2023-08-22 RX ORDER — ATENOLOL 25 MG/1
12.5 TABLET ORAL EVERY 12 HOURS SCHEDULED
Status: DISCONTINUED | OUTPATIENT
Start: 2023-08-22 | End: 2023-08-25 | Stop reason: HOSPADM

## 2023-08-22 RX ORDER — GABAPENTIN 400 MG/1
400 CAPSULE ORAL ONCE
Status: COMPLETED | OUTPATIENT
Start: 2023-08-22 | End: 2023-08-22

## 2023-08-22 RX ORDER — FENTANYL CITRATE 50 UG/ML
INJECTION, SOLUTION INTRAMUSCULAR; INTRAVENOUS AS NEEDED
Status: DISCONTINUED | OUTPATIENT
Start: 2023-08-22 | End: 2023-08-22 | Stop reason: SURG

## 2023-08-22 RX ADMIN — ACETAMINOPHEN 1000 MG: 500 TABLET, FILM COATED ORAL at 06:19

## 2023-08-22 RX ADMIN — FENTANYL CITRATE 50 MCG: 50 INJECTION, SOLUTION INTRAMUSCULAR; INTRAVENOUS at 07:11

## 2023-08-22 RX ADMIN — PROPOFOL 50 MG: 10 INJECTION, EMULSION INTRAVENOUS at 07:50

## 2023-08-22 RX ADMIN — VECURONIUM BROMIDE 8 MG: 1 INJECTION, POWDER, LYOPHILIZED, FOR SOLUTION INTRAVENOUS at 07:14

## 2023-08-22 RX ADMIN — MIDAZOLAM HYDROCHLORIDE 1 MG: 1 INJECTION, SOLUTION INTRAMUSCULAR; INTRAVENOUS at 07:07

## 2023-08-22 RX ADMIN — Medication 2000 MG: at 07:20

## 2023-08-22 RX ADMIN — MIDAZOLAM HYDROCHLORIDE 1 MG: 1 INJECTION, SOLUTION INTRAMUSCULAR; INTRAVENOUS at 07:12

## 2023-08-22 RX ADMIN — SODIUM CHLORIDE, SODIUM GLUCONATE, SODIUM ACETATE, POTASSIUM CHLORIDE AND MAGNESIUM CHLORIDE 1000 ML: 526; 502; 368; 37; 30 INJECTION, SOLUTION INTRAVENOUS at 06:10

## 2023-08-22 RX ADMIN — FENTANYL CITRATE 50 MCG: 50 INJECTION, SOLUTION INTRAMUSCULAR; INTRAVENOUS at 07:46

## 2023-08-22 RX ADMIN — ALVIMOPAN 12 MG: 12 CAPSULE ORAL at 06:19

## 2023-08-22 RX ADMIN — POTASSIUM CHLORIDE, DEXTROSE MONOHYDRATE AND SODIUM CHLORIDE 100 ML/HR: 150; 5; 450 INJECTION, SOLUTION INTRAVENOUS at 12:32

## 2023-08-22 RX ADMIN — FENTANYL CITRATE 50 MCG: 50 INJECTION, SOLUTION INTRAMUSCULAR; INTRAVENOUS at 07:13

## 2023-08-22 RX ADMIN — ONDANSETRON 4 MG: 2 INJECTION INTRAMUSCULAR; INTRAVENOUS at 12:50

## 2023-08-22 RX ADMIN — HYDROMORPHONE HYDROCHLORIDE 0.25 MG: 1 INJECTION, SOLUTION INTRAMUSCULAR; INTRAVENOUS; SUBCUTANEOUS at 10:45

## 2023-08-22 RX ADMIN — MELOXICAM 15 MG: 15 TABLET ORAL at 06:19

## 2023-08-22 RX ADMIN — ONDANSETRON 4 MG: 2 INJECTION INTRAMUSCULAR; INTRAVENOUS at 09:23

## 2023-08-22 RX ADMIN — PROPOFOL 100 MG: 10 INJECTION, EMULSION INTRAVENOUS at 07:14

## 2023-08-22 RX ADMIN — HYDROMORPHONE HYDROCHLORIDE 0.25 MG: 1 INJECTION, SOLUTION INTRAMUSCULAR; INTRAVENOUS; SUBCUTANEOUS at 11:03

## 2023-08-22 RX ADMIN — FAMOTIDINE 20 MG: 20 TABLET ORAL at 20:41

## 2023-08-22 RX ADMIN — ACETAMINOPHEN 1000 MG: 500 TABLET, FILM COATED ORAL at 12:39

## 2023-08-22 RX ADMIN — GABAPENTIN 400 MG: 400 CAPSULE ORAL at 06:19

## 2023-08-22 RX ADMIN — SODIUM CHLORIDE, SODIUM GLUCONATE, SODIUM ACETATE, POTASSIUM CHLORIDE AND MAGNESIUM CHLORIDE: 526; 502; 368; 37; 30 INJECTION, SOLUTION INTRAVENOUS at 09:35

## 2023-08-22 RX ADMIN — ONDANSETRON 4 MG: 2 INJECTION INTRAMUSCULAR; INTRAVENOUS at 18:36

## 2023-08-22 RX ADMIN — VECURONIUM BROMIDE 2 MG: 1 INJECTION, POWDER, LYOPHILIZED, FOR SOLUTION INTRAVENOUS at 08:06

## 2023-08-22 RX ADMIN — EPHEDRINE SULFATE 10 MG: 50 INJECTION INTRAVENOUS at 08:36

## 2023-08-22 RX ADMIN — Medication 12.5 MG: at 20:41

## 2023-08-22 RX ADMIN — DEXAMETHASONE SODIUM PHOSPHATE 4 MG: 4 INJECTION, SOLUTION INTRAMUSCULAR; INTRAVENOUS at 08:14

## 2023-08-22 RX ADMIN — ONDANSETRON 4 MG: 2 INJECTION INTRAMUSCULAR; INTRAVENOUS at 06:19

## 2023-08-22 RX ADMIN — LIDOCAINE HYDROCHLORIDE 50 MG: 20 INJECTION, SOLUTION EPIDURAL; INFILTRATION; INTRACAUDAL; PERINEURAL at 07:13

## 2023-08-22 RX ADMIN — FAMOTIDINE 20 MG: 20 TABLET ORAL at 12:39

## 2023-08-22 RX ADMIN — ACETAMINOPHEN 1000 MG: 500 TABLET, FILM COATED ORAL at 17:54

## 2023-08-22 RX ADMIN — HYDROMORPHONE HYDROCHLORIDE 0.25 MG: 1 INJECTION, SOLUTION INTRAMUSCULAR; INTRAVENOUS; SUBCUTANEOUS at 11:27

## 2023-08-22 RX ADMIN — HYDROMORPHONE HYDROCHLORIDE 0.25 MG: 1 INJECTION, SOLUTION INTRAMUSCULAR; INTRAVENOUS; SUBCUTANEOUS at 10:32

## 2023-08-22 RX ADMIN — FENTANYL CITRATE 50 MCG: 50 INJECTION, SOLUTION INTRAMUSCULAR; INTRAVENOUS at 08:59

## 2023-08-22 RX ADMIN — SUGAMMADEX 200 MG: 100 INJECTION, SOLUTION INTRAVENOUS at 10:01

## 2023-08-22 RX ADMIN — ALBUMIN (HUMAN): 12.5 INJECTION, SOLUTION INTRAVENOUS at 08:38

## 2023-08-22 NOTE — ANESTHESIA PREPROCEDURE EVALUATION
Anesthesia Evaluation     Patient summary reviewed and Nursing notes reviewed   history of anesthetic complications:  PONV  NPO Solid Status: > 8 hours  NPO Liquid Status: < 2 hours           Airway   Mallampati: II  TM distance: <3 FB  Neck ROM: full  Anterior and Possible difficult intubation  Dental    (+) upper dentures and partials    Pulmonary     breath sounds clear to auscultation  (-) asthma, shortness of breath, sleep apnea, rhonchi, decreased breath sounds, wheezes, rales, not a smoker  Cardiovascular   Exercise tolerance: good (4-7 METS)    ECG reviewed  Patient on routine beta blocker and Beta blocker given within 24 hours of surgery  Rhythm: regular  Rate: normal    (+) hypertension less than 2 medications  (-) past MI, dysrhythmias, angina, murmur, cardiac stents, DVT    ROS comment: EK23  Normal sinus rhythm with sinus arrhythmia  Nonspecific ST abnormality  Abnormal ECG  No previous ECGs available      Neuro/Psych  (-) seizures, TIA, CVA  GI/Hepatic/Renal/Endo    (+) GERD well controlled  (-)  obesity, liver disease, no renal disease, diabetes, no thyroid disorder    Musculoskeletal     Abdominal   (-) obese   Substance History   (-) alcohol use, drug use     OB/GYN    (-)  Pregnant        Other   blood dyscrasia anemia,   history of cancer (colon) active    ROS/Med Hx Other: Hx: GERD: controlled on omeprazole     CT Abdomen/Pelvis: 8/10/23  Impression        1.  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    2.  The liver is normal and there is no evidence of peritoneal spread or central adenopathy.    3.  Gallstones.    [id number]  Narrative    Indication:  Colon cancer in the mid transverse colon.    CT, Abdomen and Pelvis with 100 mL Omnipaque:  The liver has a homogeneous, normal appearance.  There are several gallstones, and no mural thickening or inflammatory change.  The common duct  is small.  No pancreatic mass or inflammation.  The spleen and  adrenal glands are unremarkable.  The kidneys do not have any hydronephrosis, solid masses, or perinephric edema.  No retroperitoneal adenopathy or mass.    The bladder has a normal appearance.  The uterus and ovaries are unremarkable.    There are a few diverticula in the distal colon with no active inflammation.  Where the distal transverse colon meets the splenic flexure there is a 3.0 cm length of significant mural thickening with serosal margins that are a little indistinct and there   are several mildly enlarged local lymph nodes.  No peritoneal implants or ascites.                    Anesthesia Plan    ASA 4     general     (Hgb: 8.5  T&S pending  HX: PONV 4mg zofran given IV for nausea this am. Will not place scop patch secondary to age.   Discussed possible placement of 2nd IV and arterial line pt understands risks and benefits of anesthesia and agrees to proceed. )  intravenous induction     Anesthetic plan, risks, benefits, and alternatives have been provided, discussed and informed consent has been obtained with: patient.    Use of blood products discussed with patient  Consented to blood products.      CODE STATUS:

## 2023-08-22 NOTE — ANESTHESIA PROCEDURE NOTES
Airway  Urgency: elective    Date/Time: 8/22/2023 7:17 AM  End Time:8/22/2023 7:17 AM  Airway not difficult    General Information and Staff    Patient location during procedure: OR  CRNA/CAA: Shamika Friedman CRNA    Indications and Patient Condition  Indications for airway management: airway protection    Preoxygenated: yes  Mask difficulty assessment: 1 - vent by mask    Final Airway Details  Final airway type: endotracheal airway      Successful airway: ETT  Cuffed: yes   Successful intubation technique: video laryngoscopy  Facilitating devices/methods: intubating stylet  Endotracheal tube insertion site: oral  Blade: Palacios  Blade size: 3  ETT size (mm): 7.0  Cormack-Lehane Classification: grade I - full view of glottis  Placement verified by: chest auscultation and capnometry   Cuff volume (mL): 5  Measured from: lips  ETT/EBT  to lips (cm): 20  Number of attempts at approach: 1  Assessment: lips, teeth, and gum same as pre-op and atraumatic intubation

## 2023-08-22 NOTE — OP NOTE
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     Estimated Blood Loss: minimal    Specimens:                ID Type Source Tests Collected by Time   A : SPLENIC FLEXURE, DISTAL TRANSVERSE AND PROXIMAL DESCENDING COLON WITH LYMPH NODES, OPEN LOOK AT AND SEND BACK Tissue Large Intestine, Left / Descending Colon TISSUE EXAM, [facility] ([facility]) Sarmad Bateman MD 8/22/2023 0908         Drains:   Urethral Catheter 16 Fr. (Active)   Collection Container Standard drainage bag 08/22/23 1029   Securement Method Securing device 08/22/23 1014       Indications: 78-year-old female recently diagnosed with distal transverse colon adenocarcinoma presents now for resection.    Findings: Distal transverse adenocarcinoma resected with more than adequate margins.  No evidence of any metastatic disease    Complications: None    Procedure: The patient was brought to the operating room.  She is on ERAS protocol.  She was placed under general endotracheal anesthesia without any difficulty.  She received Mefoxin IV antibiotics preoperatively.  She had SCD's in place.  Kasper catheter was placed.  The abdomen was prepped and draped in normal sterile fashion.  Appropriate timeout was taken and  everyone was in agreement.  Cut down was performed at supraumbilical position and 10/11 Florencio trocar was placed without any difficulty.  To have abdominal wall block was performed bilaterally with Exalipril.  Exploration of the abdomen was unremarkable.  She had a large omentum that extended down into the pelvis and had some adhesions to the left colon.  We placed a 5 mm trocar in the left side of the abdomen and another 5 mm trocar in the upper epigastrium.  We moved the cannula to the lateral trocar and then using the Enseal device and grasper we freed up the omentum and was able to identify where the tumor appeared to be, where it was tattooed.  We mobilized the left colon starting inferiorly at the white line of Toldt and followed that up to the splenic flexure mobilizing the left colon medially.  Most of the descending colon was mobilized this way.  We then went superiorly and omentum was pretty significant.  We were able to put it up over the stomach and started taking the omentum off of the colon anteriorly.  We followed this up to the splenic flexure, got to be where the omentum was more difficult to do laparoscopically so we elected to convert this to the open portion.  All trocars were removed.  Skin incision was made between the 2 trocars on the midline and  just to the edge of the umbilicus.  We opened this up using electrocautery without any difficulty.  Wound protector was placed into position.  We had good exposure of the upper abdomen.  Omentum was placed up in the upper abdomen over the stomach.  The transverse colon was mobilized medially to laterally, the omentum was still attached up around the splenic flexure so we took this off.  We followed the colon superiorly, we could tell where the tumor was and stayed well away from the tumor as we mobilized the colon free, it did not appear to involve any other structures.  We freed up the splenic flexure then using electrocautery and some use of the  Enseal device.  We then followed this initially inferiorly, freed up more of the descending colon and then we freed up more of the transverse colon proximally.  Middle colic vessels were identified, we elected to take the middle colic vessels with our specimen so we dissected them at the base of the mesentery and then double ligated them with a 3-0 Vicryl suture and then 3-0 Vicryl tie.  We left the left colic vessels in place.  We crossed the base of the mesentery with the Enseal device.  Picked an area well proximal to where we felt the tumor was, could be felt and could be see what was tattooed and this was just medial to the middle colic vessels that were taken with the specimen.  We fired a stapler at that level and then we went distal.  The left colon was well mobilized at this point and came up easily.  We went distal to where the tumor was and divided the colon there but we were proximal to the left colic vessels.  Specimen was handed off and sent to Pathology, it was opened by Pathology and brought back.  We clearly had good proximal and distal margin, the tumor was where we thought it was.  We then did an end-to-end hand sewn anastomosis 1 layer with 3-0 Vicryl interrupted stitches.  We copiously irrigated, good hemostasis was noted throughout.  No evidence of any metastatic disease was present.  The colon that could be seen and felt was unremarkable.  A portion of the omentum that was overlying the tumor was taken with the specimen.  The rest of the omentum was left with the patient.  We then closed the fascia with #1 PDS short segments with 4 to 1 bites.  Subcutaneous was irrigated and the skin was closed with 3-0 Vicryl subcuticular stitches and skin staples.  Prevena wound VAC was the placed into position.  Please note that after we did the anastomosis both my assistant and I we changed our gloves.  I actually changed my gloves a couple of times throughout the procedure.  The patient was then awakened,  extubated, and transferred to the recovery room in awake and stable condition.            Colon Resection  Operation performed with curative intent Yes   Tumor Location (select all that apply) Splenic flexure   Extent of colon and vascular resection  (select all that apply) Transverse colectomy - middle colic         Disposition: Transfer to recovery in stable condition        Sarmad Bateman MD     Date: 8/22/2023  Time: 10:39 CDT

## 2023-08-22 NOTE — SIGNIFICANT NOTE
08/22/23 1331   OTHER   Discipline physical therapist;occupational therapist   Rehab Time/Intention   Session Not Performed unable to evaluate, medical status change;other (see comments)  (Patient is not currently appropriate for therapy as hemoglobin is 6.8. Therapy will follow up as appropriate.)   Recommendation   PT - Next Appointment 08/23/23   Recommendation   OT - Next Appointment 08/23/23

## 2023-08-22 NOTE — INTERVAL H&P NOTE
H&P reviewed. The patient was examined and there are no changes to the H&P.      Temp:  [97 °F (36.1 °C)] 97 °F (36.1 °C)  Heart Rate:  [65] 65  Resp:  [18] 18  BP: (172)/(76) 172/76

## 2023-08-22 NOTE — ANESTHESIA POSTPROCEDURE EVALUATION
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])    Surgeons: Sarmad Bateman MD Provider: Shamika Friedman CRNA    Anesthesia Type: general ASA Status: 4            Anesthesia Type: general    Vitals  Vitals Value Taken Time   /70 08/22/23 1014   Temp 97.5 øF (36.4 øC) 08/22/23 1014   Pulse 65 08/22/23 1014   Resp 20 08/22/23 1014   SpO2 98 % 08/22/23 1014           Post Anesthesia Care and Evaluation    Patient location during evaluation: PACU  Patient participation: complete - patient participated  Level of consciousness: sleepy but conscious  Pain score: 0  Pain management: adequate    Airway patency: patent  Anesthetic complications: No anesthetic complications  PONV Status: none  Cardiovascular status: hemodynamically stable  Respiratory status: spontaneous ventilation    Comments: 163/70 64 16 99%

## 2023-08-23 LAB
ANION GAP SERPL CALCULATED.3IONS-SCNC: 9 MMOL/L (ref 5–15)
BASOPHILS # BLD AUTO: 0.03 10*3/MM3 (ref 0–0.2)
BASOPHILS NFR BLD AUTO: 0.3 % (ref 0–1.5)
BUN SERPL-MCNC: 6 MG/DL (ref 8–23)
BUN/CREAT SERPL: 6.8 (ref 7–25)
CALCIUM SPEC-SCNC: 7.9 MG/DL (ref 8.6–10.5)
CHLORIDE SERPL-SCNC: 102 MMOL/L (ref 98–107)
CO2 SERPL-SCNC: 21 MMOL/L (ref 22–29)
CREAT SERPL-MCNC: 0.88 MG/DL (ref 0.57–1)
DEPRECATED RDW RBC AUTO: 40.5 FL (ref 37–54)
EGFRCR SERPLBLD CKD-EPI 2021: 67.4 ML/MIN/1.73
EOSINOPHIL # BLD AUTO: 0 10*3/MM3 (ref 0–0.4)
EOSINOPHIL NFR BLD AUTO: 0 % (ref 0.3–6.2)
ERYTHROCYTE [DISTWIDTH] IN BLOOD BY AUTOMATED COUNT: 13.2 % (ref 12.3–15.4)
GLUCOSE SERPL-MCNC: 128 MG/DL (ref 65–99)
HCT VFR BLD AUTO: 30.7 % (ref 34–46.6)
HGB BLD-MCNC: 9.9 G/DL (ref 12–15.9)
IMM GRANULOCYTES # BLD AUTO: 0.07 10*3/MM3 (ref 0–0.05)
IMM GRANULOCYTES NFR BLD AUTO: 0.6 % (ref 0–0.5)
LYMPHOCYTES # BLD AUTO: 1.35 10*3/MM3 (ref 0.7–3.1)
LYMPHOCYTES NFR BLD AUTO: 12.3 % (ref 19.6–45.3)
MCH RBC QN AUTO: 27.3 PG (ref 26.6–33)
MCHC RBC AUTO-ENTMCNC: 32.2 G/DL (ref 31.5–35.7)
MCV RBC AUTO: 84.6 FL (ref 79–97)
MONOCYTES # BLD AUTO: 0.82 10*3/MM3 (ref 0.1–0.9)
MONOCYTES NFR BLD AUTO: 7.5 % (ref 5–12)
NEUTROPHILS NFR BLD AUTO: 79.3 % (ref 42.7–76)
NEUTROPHILS NFR BLD AUTO: 8.68 10*3/MM3 (ref 1.7–7)
NRBC BLD AUTO-RTO: 0 /100 WBC (ref 0–0.2)
PLATELET # BLD AUTO: 281 10*3/MM3 (ref 140–450)
PMV BLD AUTO: 10.4 FL (ref 6–12)
POTASSIUM SERPL-SCNC: 4.2 MMOL/L (ref 3.5–5.2)
RBC # BLD AUTO: 3.63 10*6/MM3 (ref 3.77–5.28)
SODIUM SERPL-SCNC: 132 MMOL/L (ref 136–145)
WBC NRBC COR # BLD: 10.95 10*3/MM3 (ref 3.4–10.8)

## 2023-08-23 PROCEDURE — 25010000002 ONDANSETRON PER 1 MG: Performed by: SURGERY

## 2023-08-23 PROCEDURE — 80048 BASIC METABOLIC PNL TOTAL CA: CPT | Performed by: SURGERY

## 2023-08-23 PROCEDURE — 97166 OT EVAL MOD COMPLEX 45 MIN: CPT

## 2023-08-23 PROCEDURE — 85025 COMPLETE CBC W/AUTO DIFF WBC: CPT | Performed by: SURGERY

## 2023-08-23 PROCEDURE — 97162 PT EVAL MOD COMPLEX 30 MIN: CPT

## 2023-08-23 PROCEDURE — 25010000002 HEPARIN (PORCINE) PER 1000 UNITS: Performed by: SURGERY

## 2023-08-23 PROCEDURE — 97116 GAIT TRAINING THERAPY: CPT

## 2023-08-23 PROCEDURE — 97530 THERAPEUTIC ACTIVITIES: CPT

## 2023-08-23 RX ORDER — HEPARIN SODIUM 5000 [USP'U]/ML
5000 INJECTION, SOLUTION INTRAVENOUS; SUBCUTANEOUS EVERY 12 HOURS SCHEDULED
Status: DISCONTINUED | OUTPATIENT
Start: 2023-08-23 | End: 2023-08-25 | Stop reason: HOSPADM

## 2023-08-23 RX ADMIN — FAMOTIDINE 20 MG: 20 TABLET ORAL at 09:11

## 2023-08-23 RX ADMIN — Medication 12.5 MG: at 09:11

## 2023-08-23 RX ADMIN — ALVIMOPAN 12 MG: 12 CAPSULE ORAL at 12:44

## 2023-08-23 RX ADMIN — ACETAMINOPHEN 1000 MG: 500 TABLET, FILM COATED ORAL at 18:21

## 2023-08-23 RX ADMIN — ONDANSETRON 4 MG: 2 INJECTION INTRAMUSCULAR; INTRAVENOUS at 18:26

## 2023-08-23 RX ADMIN — ACETAMINOPHEN 1000 MG: 500 TABLET, FILM COATED ORAL at 01:59

## 2023-08-23 RX ADMIN — Medication 12.5 MG: at 20:42

## 2023-08-23 RX ADMIN — OXYCODONE HYDROCHLORIDE 5 MG: 5 TABLET ORAL at 12:44

## 2023-08-23 RX ADMIN — ACETAMINOPHEN 1000 MG: 500 TABLET, FILM COATED ORAL at 12:06

## 2023-08-23 RX ADMIN — ALVIMOPAN 12 MG: 12 CAPSULE ORAL at 21:23

## 2023-08-23 RX ADMIN — HEPARIN SODIUM 5000 UNITS: 5000 INJECTION INTRAVENOUS; SUBCUTANEOUS at 20:42

## 2023-08-23 RX ADMIN — FAMOTIDINE 20 MG: 20 TABLET ORAL at 20:41

## 2023-08-23 RX ADMIN — HEPARIN SODIUM 5000 UNITS: 5000 INJECTION INTRAVENOUS; SUBCUTANEOUS at 09:11

## 2023-08-23 RX ADMIN — POTASSIUM CHLORIDE, DEXTROSE MONOHYDRATE AND SODIUM CHLORIDE 50 ML/HR: 150; 5; 450 INJECTION, SOLUTION INTRAVENOUS at 14:26

## 2023-08-23 NOTE — PLAN OF CARE
Goal Outcome Evaluation:     Patient has done well this shift. She did require prn pain medication this shift after working with PT. Her double vision has improved this shift since eating regular food. Will continue to monitor patient.

## 2023-08-23 NOTE — THERAPY EVALUATION
Patient Name: Tim Fraser  : 1945    MRN: [id number]                              Today's Date: 2023       Admit Date: 2023    Visit Dx:     ICD-10-CM ICD-9-CM   1. Impaired mobility and ADLs  Z74.09 V49.89    Z78.9    2. Malignant neoplasm of transverse colon  C18.4 153.1     Patient Active Problem List   Diagnosis    Pseudophakia    Dry eye    Gastrointestinal bleeding    Malignant neoplasm of transverse colon    Colon cancer     Past Medical History:   Diagnosis Date    Artificial lens present     in position - both    Cancer     Cataract     Conjunctivitis     OTHER - RESOLVED    Keratoconjunctivitis sicca     Pain in eye     Presbyopia     Vision disturbance     OTHER, PHOTPSIA    Visual distortions of shape and size     Vitreous detachment of left eye      Past Surgical History:   Procedure Laterality Date    CATARACT EXTRACTION      Remove cataract, insert lens (Cataract extraction with intraocular lens implantation of the right eye)    COLONOSCOPY N/A 2023    Procedure: COLONOSCOPY 10:00;  Surgeon: Sigifredo Zapata DO;  Location: [facility] ENDOSCOPY;  Service: Gastroenterology;  Laterality: N/A;  tatoo at transverse mass    ENDOSCOPY N/A 2023    Procedure: ESOPHAGOGASTRODUODENOSCOPY 10:00;  Surgeon: Sigifredo Zapata DO;  Location: [facility] ENDOSCOPY;  Service: Gastroenterology;  Laterality: N/A;    EYE SURGERY  11/15/2010    AFTER CATARACT LASER SURGERY 72619 (YAG laser capsulotomy, left eye.)      General Information       Row Name 23 0848          OT Time and Intention    Document Type evaluation  -CM     Mode of Treatment physical therapy;occupational therapy  -CM       Row Name 23 0848          General Information    Patient Profile Reviewed yes  -CM     Prior Level of Function independent:;all household mobility;community mobility;ADL's;driving;shopping;cleaning;cooking  -CM       Row Name 23 0848          Living Environment    People in Home alone  -CM        Row Name 08/23/23 0848          Home Main Entrance    Number of Stairs, Main Entrance four  -CM     Stair Railings, Main Entrance railings on both sides of stairs  -CM       Row Name 08/23/23 0848          Stairs Within Home, Primary    Stairs, Within Home, Primary Pts two daughter-in-laws will be staying with pt at d/c to assist when needed. No AD use prior for mobility. Has walker if needed. walk-in shower w/o seat. taller toilet.  -CM     Number of Stairs, Within Home, Primary none  -CM     Stair Railings, Within Home, Primary none  -CM       Row Name 08/23/23 0848          Cognition    Orientation Status (Cognition) oriented x 4  -CM       Row Name 08/23/23 0848          Safety Issues, Functional Mobility    Impairments Affecting Function (Mobility) pain;endurance/activity tolerance  -CM               User Key  (r) = Recorded By, (t) = Taken By, (c) = Cosigned By      Initials Name Provider Type    Rachell Key OT Occupational Therapist                     Mobility/ADL's       Row Name 08/23/23 0848          Bed Mobility    Bed Mobility supine-sit;sit-supine;scooting/bridging  -CM     Scooting/Bridging [place] (Bed Mobility) modified independence  -CM     Supine-Sit [place] (Bed Mobility) modified independence  -CM     Assistive Device (Bed Mobility) head of bed elevated;bed rails  -CM       Row Name 08/23/23 0848          Transfers    Transfers sit-stand transfer;toilet transfer;stand-sit transfer  -CM       Row Name 08/23/23 0848          Sit-Stand Transfer    Sit-Stand [place] (Transfers) standby assist  -CM     Assistive Device (Sit-Stand Transfers) walker, front-wheeled  -CM       Row Name 08/23/23 0848          Stand-Sit Transfer    Stand-Sit [place] (Transfers) standby assist  -CM     Assistive Device (Stand-Sit Transfers) walker, front-wheeled  -CM       Row Name 08/23/23 0848          Toilet Transfer    Type (Toilet Transfer) sit-stand;stand-sit  -CM     [place] Level  (Toilet Transfer) standby assist  -CM     Assistive Device (Toilet Transfer) walker, front-wheeled;commode chair  -CM     Comment, (Toilet Transfer) BSC over toilet d/t pt having taller toilet at home.  -CM       Row Name 08/23/23 0848          Functional Mobility    Functional Mobility- Ind. Level contact guard assist  -CM     Functional Mobility- Device walker, front-wheeled  -CM     Functional Mobility-Distance (Feet) 250  -CM       Row Name 08/23/23 0848          Activities of Daily Living    BADL Assessment/Intervention lower body dressing;toileting  -CM       Row Name 08/23/23 0848          Lower Body Dressing Assessment/Training    [place] Level (Lower Body Dressing) lower body dressing skills;doff;don;socks;independent  -CM     Position (Lower Body Dressing) edge of bed sitting  -CM       Row Name 08/23/23 0848          Toileting Assessment/Training    [place] Level (Toileting) toileting skills;adjust/manage clothing;perform perineal hygiene;independent  -CM     Position (Toileting) sitting up in bed  -CM               User Key  (r) = Recorded By, (t) = Taken By, (c) = Cosigned By      Initials Name Provider Type    Rachell Key OT Occupational Therapist                   Obj/Interventions       Row Name 08/23/23 0855 08/23/23 0848       Sensory Assessment (Somatosensory)    Sensory Assessment (Somatosensory) --  -CM UE sensation intact  -CM      Row Name 08/23/23 0848          Range of Motion Comprehensive    General Range of Motion bilateral upper extremity ROM WFL  -CM       Row Name 08/23/23 0848          Strength Comprehensive (MMT)    Comment, General Manual Muscle Testing (MMT) Assessment Limited by abdominal pain and incision. PT with at least 3+/5 BUE strength.  -CM               User Key  (r) = Recorded By, (t) = Taken By, (c) = Cosigned By      Initials Name Provider Type    Rachell Key OT Occupational Therapist                   Goals/Plan       Row Name 08/23/23 0848           Transfer Goal 1 (OT)    Activity/Assistive Device (Transfer Goal 1, OT) toilet  -CM     [place] Level/Cues Needed (Transfer Goal 1, OT) standby assist  -CM     Time Frame (Transfer Goal 1, OT) long term goal (LTG);by discharge  -CM     Progress/Outcome (Transfer Goal 1, OT) goal not met  -CM       Row Name 08/23/23 08          Bathing Goal 1 (OT)    Activity/Device (Bathing Goal 1, OT) bathing skills, all  -CM     [place] Level/Cues Needed (Bathing Goal 1, OT) standby assist  -CM     Time Frame (Bathing Goal 1, OT) long term goal (LTG);by discharge  -CM     Progress/Outcomes (Bathing Goal 1, OT) goal not met  -CM       Row Name 08/23/23 08          Problem Specific Goal 1 (OT)    Problem Specific Goal 1 (OT) Pt will tolerate at least 15 minutes OOB ADLs/mobility with VSS and prn rest breaks for increased endurance, safety, and (I) with ADLs and mobiltiy.  -CM     Time Frame (Problem Specific Goal 1, OT) long term goal (LTG);by discharge  -CM     Progress/Outcome (Problem Specific Goal 1, OT) goal not met  -CM       Row Name 08/23/23 08          Therapy Assessment/Plan (OT)    Planned Therapy Interventions (OT) activity tolerance training;adaptive equipment training;BADL retraining;cognitive/visual perception retraining;manual therapy/joint mobilization;IADL retraining;functional balance retraining;neuromuscular control/coordination retraining;edema control/reduction;occupation/activity based interventions;ROM/therapeutic exercise;patient/caregiver education/training;passive ROM/stretching;strengthening exercise;transfer/mobility retraining  -CM               User Key  (r) = Recorded By, (t) = Taken By, (c) = Cosigned By      Initials Name Provider Type    Rachell Key OT Occupational Therapist                   Clinical Impression       Row Name 08/23/23 0848          Pain Assessment    Pretreatment Pain Rating 3/10  -CM     Pain Location - shoulder;abdomen  -CM     Pain Intervention(s)  Ambulation/increased activity;Repositioned;Distraction;Medication (See MAR)  -CM       Row Name 08/23/23 0848          Plan of Care Review    Plan of Care Reviewed With patient;family  -CM     Outcome Evaluation OT eval completed. Co-eval with PT. Pt sitting up in bed upon arrival. Alert and agreeable to therapy. Pt is (I) with ADLS and mobility at baseline. Pt lives at home alone, however, family with be staying with her at d/c until she is able to take care of herself. During session, pt was Mod I for bed mobility. SBA for STS with FWW. CGA for mobility in room and halls with FWW. SBA toilet t/f. SBA hand hygiene standing at the sink. (I) toileting and donning/doffing socks. Pt was left supine in bed with exit alarm on and all needs in reach. Pt presents with decreased endurnace and activity tolerance, impairing her (I) and safety with ADLs and mobility. IP OT will follow. Goals established. Anticiapte d/c home with assist as needed.  -CM       Row Name 08/23/23 0805          Therapy Assessment/Plan (OT)    Patient/Family Therapy Goal Statement (OT) return home  -CM     Rehab Potential (OT) good, to achieve stated therapy goals  -CM     Criteria for Skilled Therapeutic Interventions Met (OT) yes;meets criteria  -CM     Therapy Frequency (OT) 2 times/day  -CM     Predicted Duration of Therapy Intervention (OT) until d/c or all goals met  -CM       Row Name 08/23/23 0860          Therapy Plan Review/Discharge Plan (OT)    Anticipated Discharge Disposition (OT) home with assist  -CM       Row Name 08/23/23 0882          Vital Signs    Pre Systolic BP Rehab 158  Manual  -CM     Pre Treatment Diastolic BP 78  -CM     Post Systolic BP Rehab 182  Manual  -CM     Post Treatment Diastolic BP 78  -CM     Pretreatment Heart Rate (beats/min) 66  -CM     Posttreatment Heart Rate (beats/min) 62  -CM     Pre SpO2 (%) 100  -CM     O2 Delivery Pre Treatment room air  -CM     Post SpO2 (%) 99  -CM     Pre Patient Position Supine   -CM     Post Patient Position Supine  -CM       Row Name 08/23/23 0848          Positioning and Restraints    Pre-Treatment Position in bed  -CM     Post Treatment Position bed  -CM     In Bed call light within reach;encouraged to call for assist;exit alarm on;with family/caregiver;side rails up x2  -CM               User Key  (r) = Recorded By, (t) = Taken By, (c) = Cosigned By      Initials Name Provider Type    Rachell Key OT Occupational Therapist                   Outcome Measures       Row Name 08/23/23 0848          How much help from another is currently needed...    Putting on and taking off regular lower body clothing? 4  -CM     Bathing (including washing, rinsing, and drying) 3  -CM     Toileting (which includes using toilet bed pan or urinal) 4  -CM     Putting on and taking off regular upper body clothing 4  -CM     Taking care of personal grooming (such as brushing teeth) 4  -CM     Eating meals 4  -CM     AM-PAC 6 Clicks Score (OT) 23  -CM       Row Name 08/23/23 1000 08/23/23 0849       How much help from another person do you currently need...    Turning from your back to your side while in flat bed without using bedrails? 4  -DN 4  -MM    Moving from lying on back to sitting on the side of a flat bed without bedrails? 4  -DN 4  -MM    Moving to and from a bed to a chair (including a wheelchair)? 3  -DN 3  -MM    Standing up from a chair using your arms (e.g., wheelchair, bedside chair)? 3  -DN 3  -MM    Climbing 3-5 steps with a railing? 1  -DN 2  -MM    To walk in hospital room? 3  -DN 3  -MM    AM-PAC 6 Clicks Score (PT) 18  -DN 19  -MM    Highest level of mobility 6 --> Walked 10 steps or more  -DN 6 --> Walked 10 steps or more  -MM      Row Name 08/23/23 0849 08/23/23 0848       Functional Assessment    Outcome Measure Options AM-PAC 6 Clicks Basic Mobility (PT)  -MM AM-PAC 6 Clicks Daily Activity (OT)  -CM              User Key  (r) = Recorded By, (t) = Taken By, (c) = Cosigned By       Initials Name Provider Type    Ara Cai, RN Registered Nurse    Rachell Key, OT Occupational Therapist    MM Ama Jimenez, PT Physical Therapist                    Occupational Therapy Education       Title: PT OT SLP Therapies (In Progress)       Topic: Occupational Therapy (In Progress)       Point: ADL training (Done)       Description:   Instruct learner(s) on proper safety adaptation and remediation techniques during self care or transfers.   Instruct in proper use of assistive devices.                  Learning Progress Summary             Patient Acceptance, E,TB, VU by CM at 8/23/2023 1236    Comment: OT POC, Role of OT, d/c recommendations   Family Acceptance, E,TB, VU by CM at 8/23/2023 1236    Comment: OT POC, Role of OT, d/c recommendations                         Point: Home exercise program (Not Started)       Description:   Instruct learner(s) on appropriate technique for monitoring, assisting and/or progressing therapeutic exercises/activities.                  Learner Progress:  Not documented in this visit.              Point: Precautions (Done)       Description:   Instruct learner(s) on prescribed precautions during self-care and functional transfers.                  Learning Progress Summary             Patient Acceptance, E,TB, VU by CM at 8/23/2023 1236    Comment: OT POC, Role of OT, d/c recommendations   Family Acceptance, E,TB, VU by CM at 8/23/2023 1236    Comment: OT POC, Role of OT, d/c recommendations                         Point: Body mechanics (Done)       Description:   Instruct learner(s) on proper positioning and spine alignment during self-care, functional mobility activities and/or exercises.                  Learning Progress Summary             Patient Acceptance, E,TB, VU by CM at 8/23/2023 1236    Comment: OT POC, Role of OT, d/c recommendations   Family Acceptance, E,TB, VU by CM at 8/23/2023 1236    Comment: OT POC, Role of OT, d/c  recommendations                                         User Key       Initials Effective Dates Name Provider Type Discipline     11/18/22 -  Rachell Steward OT Occupational Therapist OT                  OT Recommendation and Plan  Planned Therapy Interventions (OT): activity tolerance training, adaptive equipment training, BADL retraining, cognitive/visual perception retraining, manual therapy/joint mobilization, IADL retraining, functional balance retraining, neuromuscular control/coordination retraining, edema control/reduction, occupation/activity based interventions, ROM/therapeutic exercise, patient/caregiver education/training, passive ROM/stretching, strengthening exercise, transfer/mobility retraining  Therapy Frequency (OT): 2 times/day  Plan of Care Review  Plan of Care Reviewed With: patient, family  Outcome Evaluation: OT eval completed. Co-eval with PT. Pt sitting up in bed upon arrival. Alert and agreeable to therapy. Pt is (I) with ADLS and mobility at baseline. Pt lives at home alone, however, family with be staying with her at d/c until she is able to take care of herself. During session, pt was Mod I for bed mobility. SBA for STS with FWW. CGA for mobility in room and halls with FWW. SBA toilet t/f. SBA hand hygiene standing at the sink. (I) toileting and donning/doffing socks. Pt was left supine in bed with exit alarm on and all needs in reach. Pt presents with decreased endurnace and activity tolerance, impairing her (I) and safety with ADLs and mobility. IP OT will follow. Goals established. Anticiapte d/c home with assist as needed.     Time Calculation:   Evaluation Complexity (OT)  Review Occupational Profile/Medical/Therapy History Complexity: expanded/moderate complexity  Assessment, Occupational Performance/Identification of Deficit Complexity: 3-5 performance deficits  Clinical Decision Making Complexity (OT): detailed assessment/moderate complexity  Overall Complexity of Evaluation  (OT): moderate complexity     Time Calculation- OT       Row Name 08/23/23 1237             Time Calculation- OT    OT Start Time 0848  -CM      OT Stop Time 0928  -CM      OT Time Calculation (min) 40 min  -CM      OT Received On 08/23/23  -CM      OT Goal Re-Cert Due Date 09/05/23  -CM         Untimed Charges    OT Eval/Re-eval Minutes 40  -CM         Total Minutes    Untimed Charges Total Minutes 40  -CM       Total Minutes 40  -CM                User Key  (r) = Recorded By, (t) = Taken By, (c) = Cosigned By      Initials Name Provider Type    Rachell Key OT Occupational Therapist                  Therapy Charges for Today       Code Description Service Date Service Provider Modifiers Qty    [id number] HC OT EVAL MOD COMPLEXITY 3 8/23/2023 Rachell Steward OT GO 1                 Rachell Steward OT  8/23/2023

## 2023-08-23 NOTE — THERAPY EVALUATION
Patient Name: Tim Fraser  : 1945    MRN: [id number]                              Today's Date: 2023       Admit Date: 2023    Visit Dx:     ICD-10-CM ICD-9-CM   1. Impaired mobility and ADLs  Z74.09 V49.89    Z78.9    2. Malignant neoplasm of transverse colon  C18.4 153.1   3. Impaired functional mobility, balance, gait, and endurance [Z74.09 (ICD-10-CM)]  Z74.09 V49.89     Patient Active Problem List   Diagnosis    Pseudophakia    Dry eye    Gastrointestinal bleeding    Malignant neoplasm of transverse colon    Colon cancer     Past Medical History:   Diagnosis Date    Artificial lens present     in position - both    Cancer     Cataract     Conjunctivitis     OTHER - RESOLVED    Keratoconjunctivitis sicca     Pain in eye     Presbyopia     Vision disturbance     OTHER, PHOTPSIA    Visual distortions of shape and size     Vitreous detachment of left eye      Past Surgical History:   Procedure Laterality Date    CATARACT EXTRACTION      Remove cataract, insert lens (Cataract extraction with intraocular lens implantation of the right eye)    COLONOSCOPY N/A 2023    Procedure: COLONOSCOPY 10:00;  Surgeon: Sigifredo Zapata DO;  Location: [facility] ENDOSCOPY;  Service: Gastroenterology;  Laterality: N/A;  tatoo at transverse mass    ENDOSCOPY N/A 2023    Procedure: ESOPHAGOGASTRODUODENOSCOPY 10:00;  Surgeon: Sigifredo Zapata DO;  Location: [facility] ENDOSCOPY;  Service: Gastroenterology;  Laterality: N/A;    EYE SURGERY  11/15/2010    AFTER CATARACT LASER SURGERY 04153 (YAG laser capsulotomy, left eye.)      General Information       Row Name 23 0849          Physical Therapy Time and Intention    Document Type evaluation  -MM     Mode of Treatment physical therapy;occupational therapy  -MM       Row Name 23 0849          General Information    Patient Profile Reviewed yes  -MM     Prior Level of Function independent:;all household mobility;community  mobility;gait;driving;transfer;shopping;cooking;cleaning  -MM       Row Name 08/23/23 0849          Living Environment    People in Home alone  -MM       Row Name 08/23/23 0849          Home Main Entrance    Number of Stairs, Main Entrance four  -MM     Stair Railings, Main Entrance railings on both sides of stairs  -MM       Row Name 08/23/23 0849          Stairs Within Home, Primary    Stairs, Within Home, Primary Pts two daughter-in-laws will be staying with pt at d/c to assist when needed. No AD use prior for mobility. Has walker if needed. walk-in shower w/o seat. taller toilet.  -MM     Number of Stairs, Within Home, Primary none  -MM     Stair Railings, Within Home, Primary none  -MM       Row Name 08/23/23 0849          Cognition    Orientation Status (Cognition) oriented x 4  -MM       Row Name 08/23/23 0849          Safety Issues, Functional Mobility    Impairments Affecting Function (Mobility) pain;endurance/activity tolerance  -MM               User Key  (r) = Recorded By, (t) = Taken By, (c) = Cosigned By      Initials Name Provider Type    MM Ama Jimenez, PT Physical Therapist                   Mobility       Row Name 08/23/23 0849          Bed Mobility    Bed Mobility supine-sit;sit-supine  -MM     Scooting/Bridging [place] (Bed Mobility) modified independence  -MM     Supine-Sit [place] (Bed Mobility) modified independence  -MM     Sit-Supine [place] (Bed Mobility) modified independence  -MM     Assistive Device (Bed Mobility) bed rails;head of bed elevated  -MM       Row Name 08/23/23 0849          Bed-Chair Transfer    Bed-Chair [place] (Transfers) standby assist  -MM     Assistive Device (Bed-Chair Transfers) walker, front-wheeled  -MM       Row Name 08/23/23 0849          Sit-Stand Transfer    Sit-Stand [place] (Transfers) standby assist  -MM     Assistive Device (Sit-Stand Transfers) walker, front-wheeled  -MM       Row Name 08/23/23 0849          Gait/Stairs  (Locomotion)    [place] Level (Gait) contact guard  -MM     Assistive Device (Gait) walker, front-wheeled  -MM     Distance in Feet (Gait) 5' X 1, 200' X 1  -MM     Comment, (Gait/Stairs) Patient able to ambulate 5' X 1 and 200' X 1. Patient ambulates with decreased gait speed and endurance.  -MM               User Key  (r) = Recorded By, (t) = Taken By, (c) = Cosigned By      Initials Name Provider Type    Ama Goldberg PT Physical Therapist                   Obj/Interventions       Row Name 08/23/23 0849          Range of Motion Comprehensive    General Range of Motion bilateral lower extremity ROM WFL  -MM       Row Name 08/23/23 0849          Strength Comprehensive (MMT)    General Manual Muscle Testing (MMT) Assessment other (see comments)  -MM     Comment, General Manual Muscle Testing (MMT) Assessment Bilateral hip at least 3+/5, all other 4/5 grossly.  -MM       Row Name 08/23/23 0849          Sensory Assessment (Somatosensory)    Sensory Assessment (Somatosensory) LE sensation intact  -MM               User Key  (r) = Recorded By, (t) = Taken By, (c) = Cosigned By      Initials Name Provider Type    Ama Goldberg PT Physical Therapist                   Goals/Plan       Row Name 08/23/23 0849          Bed Mobility Goal 1 (PT)    Activity/Assistive Device (Bed Mobility Goal 1, PT) sit to supine/supine to sit  -MM     [place] Level/Cues Needed (Bed Mobility Goal 1, PT) independent  -MM     Time Frame (Bed Mobility Goal 1, PT) by discharge  -MM     Strategies/Barriers (Bed Mobility Goal 1, PT) HOB flat, no bed rails.  -MM     Progress/Outcomes (Bed Mobility Goal 1, PT) goal not met  -MM       Row Name 08/23/23 0849          Transfer Goal 1 (PT)    Activity/Assistive Device (Transfer Goal 1, PT) sit-to-stand/stand-to-sit;bed-to-chair/chair-to-bed;walker, rolling  -MM     [place] Level/Cues Needed (Transfer Goal 1, PT) independent;modified independence  -MM     Time Frame  (Transfer Goal 1, PT) by discharge  -MM     Strategies/Barriers (Transfers Goal 1, PT) Left side abdominal incision. May progress to no AD if appropriate.  -MM     Progress/Outcome (Transfer Goal 1, PT) goal not met  -MM       Row Name 08/23/23 0849          Gait Training Goal 1 (PT)    Activity/Assistive Device (Gait Training Goal 1, PT) increase endurance/gait distance;walker, rolling  -MM     [place] Level (Gait Training Goal 1, PT) independent;modified independence  -MM     Distance (Gait Training Goal 1, PT) 300' X 1  -MM     Time Frame (Gait Training Goal 1, PT) by discharge  -MM     Strategies/Barriers (Gait Training Goal 1, PT) Left side abdominal incision. May progress to no AD if appropriate.  -MM     Progress/Outcome (Gait Training Goal 1, PT) goal not met  -MM       Row Name 08/23/23 0849          Stairs Goal 1 (PT)    Activity/Assistive Device (Stairs Goal 1, PT) ascending stairs;descending stairs;using handrail, left;using handrail, right  -MM     [place] Level/Cues Needed (Stairs Goal 1, PT) modified independence  -MM     Number of Stairs (Stairs Goal 1, PT) 4  -MM     Time Frame (Stairs Goal 1, PT) by discharge  -MM     Strategies/Barriers (Stairs Goal 1, PT) Left side abdominal incision. May progress to no AD if appropriate.  -MM     Progress/Outcome (Stairs Goal 1, PT) goal not met  -MM       Row Name 08/23/23 0825          Therapy Assessment/Plan (PT)    Planned Therapy Interventions (PT) balance training;bed mobility training;gait training;home exercise program;joint mobilization;lumbar stabilization;manual therapy techniques;motor coordination training;neuromuscular re-education;transfer training;stretching;strengthening;stair training;ROM (range of motion);postural re-education;patient/family education  -MM               User Key  (r) = Recorded By, (t) = Taken By, (c) = Cosigned By      Initials Name Provider Type    Ama Goldberg, PT Physical Therapist                    Clinical Impression       Row Name 08/23/23 0849          Pain    Pretreatment Pain Rating 3/10  -MM     Posttreatment Pain Rating 3/10  -MM     Pain Location - Side/Orientation Bilateral  -MM     Pain Location - shoulder;abdomen  -MM     Pain Intervention(s) Repositioned;Ambulation/increased activity  -MM       Row Name 08/23/23 0849          Plan of Care Review    Plan of Care Reviewed With patient;family  -MM     Outcome Evaluation PT evaluation completed, co-eval with OT. Patient AO X 4 and agreeable to therapy. Patient was able to supine<>sit and scoot with Mod I, sit>stand and bed>chair transfer with SBA. Patient was able to ambulate 5' X 1 and 200' X 1 with FWW and CGA. RN notified of BP increasing to 182/78. Goals created, continue skilled IP PT. Recommend patient d/c home with assist.  -MM       Row Name 08/23/23 0849          Therapy Assessment/Plan (PT)    Rehab Potential (PT) good, to achieve stated therapy goals  -MM     Criteria for Skilled Interventions Met (PT) yes;skilled treatment is necessary  -MM     Therapy Frequency (PT) 2 times/day  -MM       Row Name 08/23/23 0849          Vital Signs    Pre Systolic BP Rehab 158  manual  -MM     Pre Treatment Diastolic BP 78  -MM     Post Systolic BP Rehab 182  manual  -MM     Post Treatment Diastolic BP 78  -MM     Pretreatment Heart Rate (beats/min) 66  -MM     Posttreatment Heart Rate (beats/min) 62  -MM     Pre SpO2 (%) 100  -MM     O2 Delivery Pre Treatment room air  -MM     Post SpO2 (%) 99  -MM     O2 Delivery Post Treatment room air  -MM     Pre Patient Position Supine  -MM     Post Patient Position Supine  -MM       Row Name 08/23/23 0849          Positioning and Restraints    Pre-Treatment Position in bed  -MM     Post Treatment Position bed  -MM     In Bed with family/caregiver;exit alarm on;encouraged to call for assist;call light within reach  -MM               User Key  (r) = Recorded By, (t) = Taken By, (c) = Cosigned By      Initials Name  Provider Type    Ama Goldberg, PT Physical Therapist                   Outcome Measures       Row Name 08/23/23 1000 08/23/23 0849       How much help from another person do you currently need...    Turning from your back to your side while in flat bed without using bedrails? 4  -DN 4  -MM    Moving from lying on back to sitting on the side of a flat bed without bedrails? 4  -DN 4  -MM    Moving to and from a bed to a chair (including a wheelchair)? 3  -DN 3  -MM    Standing up from a chair using your arms (e.g., wheelchair, bedside chair)? 3  -DN 3  -MM    Climbing 3-5 steps with a railing? 1  -DN 2  -MM    To walk in hospital room? 3  -DN 3  -MM    AM-PAC 6 Clicks Score (PT) 18  -DN 19  -MM    Highest level of mobility 6 --> Walked 10 steps or more  -DN 6 --> Walked 10 steps or more  -MM      Row Name 08/23/23 0849 08/23/23 0848       Functional Assessment    Outcome Measure Options AM-PAC 6 Clicks Basic Mobility (PT)  -MM AM-PAC 6 Clicks Daily Activity (OT)  -CM              User Key  (r) = Recorded By, (t) = Taken By, (c) = Cosigned By      Initials Name Provider Type    Ara Cai, SARAH Registered Nurse    Rachell Key, OT Occupational Therapist    Ama Goldberg, PT Physical Therapist                                 Physical Therapy Education       Title: PT OT SLP Therapies (In Progress)       Topic: Physical Therapy (In Progress)       Point: Mobility training (In Progress)       Learning Progress Summary             Patient Acceptance, E, NR by MM at 8/23/2023 1236    Comment: PT POC and goals, proper hand placement to facilitate transfers, use of FWW to increase safety with ambulation.   Family Acceptance, E, NR by MM at 8/23/2023 1236    Comment: PT POC and goals, proper hand placement to facilitate transfers, use of FWW to increase safety with ambulation.                         Point: Home exercise program (Not Started)       Learner Progress:  Not documented in  this visit.              Point: Body mechanics (Not Started)       Learner Progress:  Not documented in this visit.              Point: Precautions (Not Started)       Learner Progress:  Not documented in this visit.                              User Key       Initials Effective Dates Name Provider Type Discipline     06/26/23 -  Ama Jimenez, PT Physical Therapist PT                  PT Recommendation and Plan  Planned Therapy Interventions (PT): balance training, bed mobility training, gait training, home exercise program, joint mobilization, lumbar stabilization, manual therapy techniques, motor coordination training, neuromuscular re-education, transfer training, stretching, strengthening, stair training, ROM (range of motion), postural re-education, patient/family education  Plan of Care Reviewed With: patient, family  Outcome Evaluation: PT evaluation completed, co-eval with OT. Patient AO X 4 and agreeable to therapy. Patient was able to supine<>sit and scoot with Mod I, sit>stand and bed>chair transfer with SBA. Patient was able to ambulate 5' X 1 and 200' X 1 with FWW and CGA. RN notified of BP increasing to 182/78. Goals created, continue skilled IP PT. Recommend patient d/c home with assist.     Time Calculation:   PT Evaluation Complexity  History, PT Evaluation Complexity: 3 or more personal factors and/or comorbidities  Examination of Body Systems (PT Eval Complexity): total of 4 or more elements  Clinical Presentation (PT Evaluation Complexity): evolving  Clinical Decision Making (PT Evaluation Complexity): moderate complexity  Overall Complexity (PT Evaluation Complexity): moderate complexity     PT Charges       Row Name 08/23/23 1239             Time Calculation    Start Time 0849  -MM      Stop Time 0928  -MM      Time Calculation (min) 39 min  -MM      PT Received On 08/23/23  -MM      PT Goal Re-Cert Due Date 09/05/23  -MM         Untimed Charges    PT Eval/Re-eval Minutes 39  -MM          Total Minutes    Untimed Charges Total Minutes 39  -MM       Total Minutes 39  -MM                User Key  (r) = Recorded By, (t) = Taken By, (c) = Cosigned By      Initials Name Provider Type    Ama Goldberg, PT Physical Therapist                  Therapy Charges for Today       Code Description Service Date Service Provider Modifiers Qty    06964594097 HC PT EVAL MOD COMPLEXITY 3 8/23/2023 Ama Jimenez, PT GP 1            PT G-Codes  Outcome Measure Options: AM-PAC 6 Clicks Basic Mobility (PT)  AM-PAC 6 Clicks Score (PT): 18  AM-PAC 6 Clicks Score (OT): 23  PT Discharge Summary  Anticipated Discharge Disposition (PT): home with assist    Ama Jimenez, PT  8/23/2023

## 2023-08-23 NOTE — PLAN OF CARE
Goal Outcome Evaluation:  Plan of Care Reviewed With: patient        Progress: improving  Outcome Evaluation: VSS, incision CDI, Prevena in place and functioning appropriately, no complaints, tolerating diet at this time.

## 2023-08-23 NOTE — PROGRESS NOTES
"  Subjective:  Postop day #1 left colon resection.  Nausea has resolved.  No vomiting.  Kasper removed this morning.  Up ambulating.  Mild intermittent double vision but no other complaints.  No complaints of this before.  Seems to happen when she moves a certain way.  Tolerating sips.  Pain controlled.  Hemoglobin 10 after being transfused 2 units of packed red blood cells yesterday.     /70 (BP Location: Left arm, Patient Position: Lying)   Pulse 66   Temp 98.7 °F (37.1 °C) (Tympanic)   Resp 18   Ht 160 cm (63\")   Wt 59 kg (130 lb)   SpO2 98%   BMI 23.03 kg/m²     Lab Results (last 24 hours)       Procedure Component Value Units Date/Time    Basic Metabolic Panel [[id number]]  (Abnormal) Collected: 08/23/23 0606    Specimen: Blood Updated: 08/23/23 0714     Glucose 128 mg/dL      BUN 6 mg/dL      Creatinine 0.88 mg/dL      Sodium 132 mmol/L      Potassium 4.2 mmol/L      Chloride 102 mmol/L      CO2 21.0 mmol/L      Calcium 7.9 mg/dL      BUN/Creatinine Ratio 6.8     Anion Gap 9.0 mmol/L      eGFR 67.4 mL/min/1.73     Narrative:      GFR Normal >60  Chronic Kidney Disease <60  Kidney Failure <15    The GFR formula is only valid for adults with stable renal function between ages 18 and 70.    CBC & Differential [[id number]]  (Abnormal) Collected: 08/23/23 0606    Specimen: Blood Updated: 08/23/23 0702    Narrative:      The following orders were created for panel order CBC & Differential.  Procedure                               Abnormality         Status                     ---------                               -----------         ------                     CBC Auto Differential[[id number]]        Abnormal            Final result                 Please view results for these tests on the individual orders.    CBC Auto Differential [[id number]]  (Abnormal) Collected: 08/23/23 0606    Specimen: Blood Updated: 08/23/23 0702     WBC 10.95 10*3/mm3      RBC 3.63 10*6/mm3      Hemoglobin 9.9 g/dL      " Hematocrit 30.7 %      MCV 84.6 fL      MCH 27.3 pg      MCHC 32.2 g/dL      RDW 13.2 %      RDW-SD 40.5 fl      MPV 10.4 fL      Platelets 281 10*3/mm3      Neutrophil % 79.3 %      Lymphocyte % 12.3 %      Monocyte % 7.5 %      Eosinophil % 0.0 %      Basophil % 0.3 %      Immature Grans % 0.6 %      Neutrophils, Absolute 8.68 10*3/mm3      Lymphocytes, Absolute 1.35 10*3/mm3      Monocytes, Absolute 0.82 10*3/mm3      Eosinophils, Absolute 0.00 10*3/mm3      Basophils, Absolute 0.03 10*3/mm3      Immature Grans, Absolute 0.07 10*3/mm3      nRBC 0.0 /100 WBC     Scan Slide [[id number]] Collected: 08/22/23 1028    Specimen: Blood Updated: 08/22/23 1125     Anisocytosis Slight/1+     Ovalocytes Slight/1+     WBC Morphology Normal     Platelet Estimate Adequate    CBC (No Diff) [[id number]]  (Abnormal) Collected: 08/22/23 1028    Specimen: Blood Updated: 08/22/23 1056     WBC 7.25 10*3/mm3      RBC 2.52 10*6/mm3      Hemoglobin 6.8 g/dL      Hematocrit 21.9 %      MCV 86.9 fL      MCH 27.0 pg      MCHC 31.1 g/dL      RDW 13.2 %      RDW-SD 41.1 fl      MPV 10.0 fL      Platelets 268 10*3/mm3     TISSUE EXAM, [facility] ([facility]) [[id number]] Collected: 08/22/23 0908    Specimen: Tissue from Large Intestine, Left / Descending Colon Updated: 08/22/23 1007            Current Medications:  Current Facility-Administered Medications   Medication Dose Route Frequency Provider Last Rate Last Admin    acetaminophen (TYLENOL) tablet 1,000 mg  1,000 mg Oral Q6H Sarmad Bateman MD   1,000 mg at 08/23/23 0159    alvimopan (ENTEREG) capsule 12 mg  12 mg Oral BID Sarmad Bateman MD        atenolol (TENORMIN) half tablet 12.5 mg  12.5 mg Oral Q12H Luis A Pascal MD   12.5 mg at 08/22/23 2041    dextrose 5 % and sodium chloride 0.45 % with KCl 20 mEq/L infusion  50 mL/hr Intravenous Continuous Sarmad Bateman  mL/hr at 08/23/23 0502 100 mL/hr at 08/23/23 0502    electrolyte-A (PLASMALYTE-A) solution 1,000 mL  1,000 mL  Intravenous Continuous PRN Sarmad Bateman MD 25 mL/hr at 08/22/23 0710 New Bag at 08/22/23 0935    famotidine (PEPCID) tablet 20 mg  20 mg Oral BID Sarmad Bateman MD   20 mg at 08/22/23 2041    heparin (porcine) 5000 UNIT/ML injection 5,000 Units  5,000 Units Subcutaneous Q12H Sarmad Bateman MD        HYDROmorphone (DILAUDID) injection 0.5 mg  0.5 mg Intravenous Q2H PRN Sarmad Bateman MD        And    naloxone (NARCAN) injection 0.4 mg  0.4 mg Intravenous Q5 Min PRN Sarmad Bateman MD        nitroglycerin (NITROSTAT) SL tablet 0.4 mg  0.4 mg Sublingual Q5 Min PRN Sarmad Bateman MD        ondansetron (ZOFRAN) injection 4 mg  4 mg Intravenous Q6H PRN Sarmad Bateman MD   4 mg at 08/22/23 1836    oxyCODONE (ROXICODONE) immediate release tablet 5 mg  5 mg Oral Q4H PRN Sarmad Bateman MD           Prior to admission medications:  Medications Prior to Admission   Medication Sig Dispense Refill Last Dose    atenolol (TENORMIN) 25 MG tablet Take 0.5 tablets by mouth 2 (Two) Times a Day.   8/22/2023 at 0500    Calcium Carb-Cholecalciferol (Oyster Shell Calcium/D) 500-10 MG-MCG tablet tablet Take 1 tablet by mouth Daily.   Past Week    Cyanocobalamin (VITAMIN B 12 PO) Take 1,000 mg by mouth Daily. SL   Past Week    ferrous sulfate 325 (65 FE) MG tablet Take 1 tablet by mouth Every Night.   8/21/2023 at 1600    omeprazole (priLOSEC) 20 MG capsule Take 1 capsule by mouth Daily.   8/22/2023 at 0500    vitamin C (ASCORBIC ACID) 250 MG tablet Take 2 tablets by mouth Daily.   Past Week    vitamin D (ERGOCALCIFEROL) 1.25 MG (07755 UT) capsule capsule Take 1 capsule by mouth 1 (One) Time Per Week. TAKES EVERY WEDNESDAY   Past Week    polyethyl glycol-propyl glycol (SYSTANE) 0.4-0.3 % solution ophthalmic solution (artificial tears) Administer 1 drop to both eyes Daily As Needed.   Unknown       Physical exam: Alert and appropriate nontoxic  Abdomen soft  Pupils round and reactive to light  symmetric    Assessment : Overall doing well      Plan: Continue ERAS protocol.  Restart beta-blocker

## 2023-08-23 NOTE — THERAPY TREATMENT NOTE
Acute Care - Physical Therapy Treatment Note  [facility]     Patient Name: Tim Fraser  : 1945  MRN: [id number]  Today's Date: 2023      Visit Dx:     ICD-10-CM ICD-9-CM   1. Impaired mobility and ADLs  Z74.09 V49.89    Z78.9    2. Malignant neoplasm of transverse colon  C18.4 153.1   3. Impaired functional mobility, balance, gait, and endurance [Z74.09 (ICD-10-CM)]  Z74.09 V49.89     Patient Active Problem List   Diagnosis    Pseudophakia    Dry eye    Gastrointestinal bleeding    Malignant neoplasm of transverse colon    Colon cancer     Past Medical History:   Diagnosis Date    Artificial lens present     in position - both    Cancer     Cataract     Conjunctivitis     OTHER - RESOLVED    Keratoconjunctivitis sicca     Pain in eye     Presbyopia     Vision disturbance     OTHER, PHOTPSIA    Visual distortions of shape and size     Vitreous detachment of left eye      Past Surgical History:   Procedure Laterality Date    CATARACT EXTRACTION      Remove cataract, insert lens (Cataract extraction with intraocular lens implantation of the right eye)    COLONOSCOPY N/A 2023    Procedure: COLONOSCOPY 10:00;  Surgeon: Sigifredo Zapata DO;  Location: [facility] ENDOSCOPY;  Service: Gastroenterology;  Laterality: N/A;  tatoo at transverse mass    ENDOSCOPY N/A 2023    Procedure: ESOPHAGOGASTRODUODENOSCOPY 10:00;  Surgeon: Sigifredo Zapata DO;  Location: [facility] ENDOSCOPY;  Service: Gastroenterology;  Laterality: N/A;    EYE SURGERY  11/15/2010    AFTER CATARACT LASER SURGERY 81539 (YAG laser capsulotomy, left eye.)     PT Assessment (last 12 hours)       PT Evaluation and Treatment       Row Name 23 1438          Physical Therapy Time and Intention    Subjective Information no complaints  -CA     Document Type therapy note (daily note)  -CA     Mode of Treatment individual therapy;physical therapy  -CA       Row Name 23 1438          Pain    Pretreatment Pain Rating 2/10  -CA      Posttreatment Pain Rating 2/10  -CA       Row Name 08/23/23 1438          Cognition    Orientation Status (Cognition) oriented x 4  -CA       Row Name 08/23/23 1438          Bed Mobility    Bed Mobility supine-sit;sit-supine;scooting/bridging  -CA     Scooting/Bridging [place] (Bed Mobility) modified independence  -CA     Supine-Sit [place] (Bed Mobility) modified independence  -CA     Sit-Supine [place] (Bed Mobility) modified independence  -CA     Assistive Device (Bed Mobility) bed rails;head of bed elevated  -CA       Row Name 08/23/23 1438          Transfers    Transfers sit-stand transfer;stand-sit transfer  -CA       Row Name 08/23/23 1438          Sit-Stand Transfer    Sit-Stand [place] (Transfers) standby assist  -CA     Assistive Device (Sit-Stand Transfers) walker, front-wheeled  -CA       Row Name 08/23/23 1438          Stand-Sit Transfer    Stand-Sit [place] (Transfers) standby assist  -CA     Assistive Device (Stand-Sit Transfers) walker, front-wheeled  -CA       Row Name 08/23/23 1438          Gait/Stairs (Locomotion)    [place] Level (Gait) standby assist  -CA     Assistive Device (Gait) walker, front-wheeled  -CA     Distance in Feet (Gait) 150 and 225'  -CA     Pattern (Gait) step-through  -CA     Deviations/Abnormal Patterns (Gait) gait speed decreased;stride length decreased  -CA     [place] Level (Stairs) stand by assist  -CA     Handrail Location (Stairs) both sides  -CA     Number of Steps (Stairs) 3 x 1  -CA     Ascending Technique (Stairs) step-over-step  -CA     Descending Technique (Stairs) step-over-step  -CA       Row Name             Wound 08/22/23 0754 abdomen Incision    Wound - Properties Group Placement Date: 08/22/23  -TB Placement Time: 0754 -TB Location: abdomen  -TB Primary Wound Type: Incision  -TB    Retired Wound - Properties Group Placement Date: 08/22/23  -TB Placement Time: 0754 -TB Location: abdomen  -TB Primary Wound Type: Incision   -TB    Retired Wound - Properties Group Date first assessed: 08/22/23  -TB Time first assessed: 0754  -TB Location: abdomen  -TB Primary Wound Type: Incision  -TB      Row Name 08/23/23 1438          Vital Signs    Pre Patient Position Supine  -CA     Intra Patient Position Standing  -CA     Post Patient Position Supine  -CA       Row Name 08/23/23 1438          Positioning and Restraints    Pre-Treatment Position in bed  -CA     Post Treatment Position bed  -CA     In Bed fowlers;call light within reach;encouraged to call for assist;with family/caregiver  -CA               User Key  (r) = Recorded By, (t) = Taken By, (c) = Cosigned By      Initials Name Provider Type    CA Salvador Wong, PTA Physical Therapist Assistant    TB Cherelle Colvin, RN Registered Nurse                    Physical Therapy Education       Title: PT OT SLP Therapies (In Progress)       Topic: Physical Therapy (In Progress)       Point: Mobility training (In Progress)       Learning Progress Summary             Patient Acceptance, E, NR by MM at 8/23/2023 1236    Comment: PT POC and goals, proper hand placement to facilitate transfers, use of FWW to increase safety with ambulation.   Family Acceptance, E, NR by MM at 8/23/2023 1236    Comment: PT POC and goals, proper hand placement to facilitate transfers, use of FWW to increase safety with ambulation.                         Point: Home exercise program (Not Started)       Learner Progress:  Not documented in this visit.              Point: Body mechanics (Not Started)       Learner Progress:  Not documented in this visit.              Point: Precautions (Not Started)       Learner Progress:  Not documented in this visit.                              User Key       Initials Effective Dates Name Provider Type Discipline     06/26/23 -  Ama Jimenez, PT Physical Therapist PT                  PT Recommendation and Plan     Plan of Care Reviewed With: patient  Progress:  improving  Outcome Evaluation: Pt. was mod(I) for bed mobility and was sba for transfers and gait this tx. Pt. amb. 150' and then 225' with use of a RW and took a standing rest break between gait trips.  Pt. also asc/desc 3 steps with sba and use of B handrails.  Pt. met 1 new goal this tx. and improving well.       Time Calculation:    PT Charges       Row Name 08/23/23 1515 08/23/23 1239          Time Calculation    Start Time 1438  -CA 0849  -MM     Stop Time 1502  -CA 0928  -MM     Time Calculation (min) 24 min  -CA 39 min  -MM     PT Received On 08/23/23  -CA 08/23/23  -MM     PT Goal Re-Cert Due Date -- 09/05/23  -MM        Time Calculation- PT    Total Timed Code Minutes- PT 24 minute(s)  -CA --        Untimed Charges    PT Eval/Re-eval Minutes -- 39  -MM        Total Minutes    Untimed Charges Total Minutes -- 39  -MM      Total Minutes -- 39  -MM               User Key  (r) = Recorded By, (t) = Taken By, (c) = Cosigned By      Initials Name Provider Type    CA Salvador Wong PTA Physical Therapist Assistant    MM Ama Jimenez, PT Physical Therapist                  Therapy Charges for Today       Code Description Service Date Service Provider Modifiers Qty    73923014108 HC GAIT TRAINING EA 15 MIN 8/23/2023 Salvador Wong PTA GP 1    73244740728 HC PT THERAPEUTIC ACT EA 15 MIN 8/23/2023 Salvador Wong PTA GP 1            PT G-Codes  Outcome Measure Options: AM-PAC 6 Clicks Basic Mobility (PT)  AM-PAC 6 Clicks Score (PT): 18  AM-PAC 6 Clicks Score (OT): 23    Salvador Wong PTA  8/23/2023

## 2023-08-23 NOTE — PLAN OF CARE
Goal Outcome Evaluation:  Plan of Care Reviewed With: patient, family           Outcome Evaluation: OT eval completed. Co-eval with PT. Pt sitting up in bed upon arrival. Alert and agreeable to therapy. Pt is (I) with ADLS and mobility at baseline. Pt lives at home alone, however, family with be staying with her at d/c until she is able to take care of herself. During session, pt was Mod I for bed mobility. SBA for STS with FWW. CGA for mobility in room and halls with FWW. SBA toilet t/f. SBA hand hygiene standing at the sink. (I) toileting and donning/doffing socks. Pt was left supine in bed with exit alarm on and all needs in reach. Pt presents with decreased endurnace and activity tolerance, impairing her (I) and safety with ADLs and mobility. IP OT will follow. Goals established. Anticiapte d/c home with assist as needed.      Anticipated Discharge Disposition (OT): home with assist

## 2023-08-23 NOTE — THERAPY TREATMENT NOTE
Patient Name: Tim Fraser  : 1945    MRN: [id number]                              Today's Date: 2023       Admit Date: 2023    Visit Dx:     ICD-10-CM ICD-9-CM   1. Impaired mobility and ADLs  Z74.09 V49.89    Z78.9    2. Malignant neoplasm of transverse colon  C18.4 153.1   3. Impaired functional mobility, balance, gait, and endurance [Z74.09 (ICD-10-CM)]  Z74.09 V49.89     Patient Active Problem List   Diagnosis    Pseudophakia    Dry eye    Gastrointestinal bleeding    Malignant neoplasm of transverse colon    Colon cancer     Past Medical History:   Diagnosis Date    Artificial lens present     in position - both    Cancer     Cataract     Conjunctivitis     OTHER - RESOLVED    Keratoconjunctivitis sicca     Pain in eye     Presbyopia     Vision disturbance     OTHER, PHOTPSIA    Visual distortions of shape and size     Vitreous detachment of left eye      Past Surgical History:   Procedure Laterality Date    CATARACT EXTRACTION      Remove cataract, insert lens (Cataract extraction with intraocular lens implantation of the right eye)    COLONOSCOPY N/A 2023    Procedure: COLONOSCOPY 10:00;  Surgeon: Sigifredo Zapata DO;  Location: [facility] ENDOSCOPY;  Service: Gastroenterology;  Laterality: N/A;  tatoo at transverse mass    ENDOSCOPY N/A 2023    Procedure: ESOPHAGOGASTRODUODENOSCOPY 10:00;  Surgeon: Sigifredo Zapata DO;  Location: [facility] ENDOSCOPY;  Service: Gastroenterology;  Laterality: N/A;    EYE SURGERY  11/15/2010    AFTER CATARACT LASER SURGERY 39895 (YAG laser capsulotomy, left eye.)      General Information       Row Name 23 1510 23 0848       OT Time and Intention    Document Type therapy note (daily note)  -CM evaluation  -CM    Mode of Treatment individual therapy;occupational therapy  -CM physical therapy;occupational therapy  -CM      Row Name 23 1510 23 0848       General Information    Patient Profile Reviewed yes  -CM yes  -CM    Prior  Level of Function -- independent:;all household mobility;community mobility;ADL's;driving;shopping;cleaning;cooking  -CM      Row Name 08/23/23 0848          Living Environment    People in Home alone  -CM       Row Name 08/23/23 0848          Home Main Entrance    Number of Stairs, Main Entrance four  -CM     Stair Railings, Main Entrance railings on both sides of stairs  -CM       Row Name 08/23/23 0848          Stairs Within Home, Primary    Stairs, Within Home, Primary Pts two daughter-in-laws will be staying with pt at d/c to assist when needed. No AD use prior for mobility. Has walker if needed. walk-in shower w/o seat. taller toilet.  -CM     Number of Stairs, Within Home, Primary none  -CM     Stair Railings, Within Home, Primary none  -CM       Row Name 08/23/23 1510 08/23/23 0848       Cognition    Orientation Status (Cognition) oriented x 4  -CM oriented x 4  -CM      Row Name 08/23/23 1510 08/23/23 0848       Safety Issues, Functional Mobility    Impairments Affecting Function (Mobility) pain;endurance/activity tolerance  -CM pain;endurance/activity tolerance  -CM              User Key  (r) = Recorded By, (t) = Taken By, (c) = Cosigned By      Initials Name Provider Type    CM Rachell Steward OT Occupational Therapist                     Mobility/ADL's       Row Name 08/23/23 1510 08/23/23 0848       Bed Mobility    Bed Mobility supine-sit;sit-supine;scooting/bridging  -CM supine-sit;sit-supine;scooting/bridging  -CM    Scooting/Bridging [place] (Bed Mobility) modified independence  -CM modified independence  -CM    Supine-Sit [place] (Bed Mobility) modified independence  -CM modified independence  -CM    Sit-Supine [place] (Bed Mobility) modified independence  -CM --    Assistive Device (Bed Mobility) bed rails;head of bed elevated  -CM head of bed elevated;bed rails  -CM      Row Name 08/23/23 1510 08/23/23 0848       Transfers    Transfers sit-stand transfer;stand-sit transfer  -CM  sit-stand transfer;toilet transfer;stand-sit transfer  -CM      Row Name 08/23/23 1510 08/23/23 0848       Sit-Stand Transfer    Sit-Stand [place] (Transfers) standby assist  -CM standby assist  -CM    Assistive Device (Sit-Stand Transfers) walker, front-wheeled  -CM walker, front-wheeled  -CM      Row Name 08/23/23 1510 08/23/23 0848       Stand-Sit Transfer    Stand-Sit [place] (Transfers) standby assist  -CM standby assist  -CM    Assistive Device (Stand-Sit Transfers) walker, front-wheeled  -CM walker, front-wheeled  -CM      Row Name 08/23/23 0848          Toilet Transfer    Type (Toilet Transfer) sit-stand;stand-sit  -CM     [place] Level (Toilet Transfer) standby assist  -CM     Assistive Device (Toilet Transfer) walker, front-wheeled;commode chair  -CM     Comment, (Toilet Transfer) BSC over toilet d/t pt having taller toilet at home.  -CM       Row Name 08/23/23 1510 08/23/23 0848       Functional Mobility    Functional Mobility- Ind. Level contact guard assist  -CM contact guard assist  -CM    Functional Mobility- Device walker, front-wheeled  -CM walker, front-wheeled  -CM    Functional Mobility-Distance (Feet) 350  -  -CM      Row Name 08/23/23 0848          Activities of Daily Living    BADL Assessment/Intervention lower body dressing;toileting  -CM       Row Name 08/23/23 0848          Lower Body Dressing Assessment/Training    [place] Level (Lower Body Dressing) lower body dressing skills;doff;don;socks;independent  -CM     Position (Lower Body Dressing) edge of bed sitting  -CM       Row Name 08/23/23 0848          Toileting Assessment/Training    [place] Level (Toileting) toileting skills;adjust/manage clothing;perform perineal hygiene;independent  -CM     Position (Toileting) sitting up in bed  -CM               User Key  (r) = Recorded By, (t) = Taken By, (c) = Cosigned By      Initials Name Provider Type    Rachell Key OT Occupational Therapist                    Obj/Interventions       Row Name 08/23/23 0855 08/23/23 0848       Sensory Assessment (Somatosensory)    Sensory Assessment (Somatosensory) --  -CM UE sensation intact  -CM      Row Name 08/23/23 0848          Range of Motion Comprehensive    General Range of Motion bilateral upper extremity ROM WFL  -CM       Row Name 08/23/23 0848          Strength Comprehensive (MMT)    Comment, General Manual Muscle Testing (MMT) Assessment Limited by abdominal pain and incision. PT with at least 3+/5 BUE strength.  -CM               User Key  (r) = Recorded By, (t) = Taken By, (c) = Cosigned By      Initials Name Provider Type    Rachell Key, OT Occupational Therapist                   Goals/Plan       Row Name 08/23/23 1410 08/23/23 0848       Transfer Goal 1 (OT)    Activity/Assistive Device (Transfer Goal 1, OT) toilet  -CM toilet  -CM    [place] Level/Cues Needed (Transfer Goal 1, OT) standby assist  -CM standby assist  -CM    Time Frame (Transfer Goal 1, OT) long term goal (LTG);by discharge  -CM long term goal (LTG);by discharge  -CM    Progress/Outcome (Transfer Goal 1, OT) goal not met  -CM goal not met  -CM      Row Name 08/23/23 1410 08/23/23 0848       Bathing Goal 1 (OT)    Activity/Device (Bathing Goal 1, OT) bathing skills, all  -CM bathing skills, all  -CM    [place] Level/Cues Needed (Bathing Goal 1, OT) standby assist  -CM standby assist  -CM    Time Frame (Bathing Goal 1, OT) long term goal (LTG);by discharge  -CM long term goal (LTG);by discharge  -CM    Progress/Outcomes (Bathing Goal 1, OT) goal not met  -CM goal not met  -CM      Row Name 08/23/23 1410 08/23/23 0848       Problem Specific Goal 1 (OT)    Problem Specific Goal 1 (OT) Pt will tolerate at least 15 minutes OOB ADLs/mobility with VSS and prn rest breaks for increased endurance, safety, and (I) with ADLs and mobiltiy.  -CM Pt will tolerate at least 15 minutes OOB ADLs/mobility with VSS and prn rest breaks for increased  endurance, safety, and (I) with ADLs and mobiltiy.  -CM    Time Frame (Problem Specific Goal 1, OT) long term goal (LTG);by discharge  -CM long term goal (LTG);by discharge  -CM    Progress/Outcome (Problem Specific Goal 1, OT) goal met   -CM goal not met  -CM      Row Name 08/23/23 0848          Therapy Assessment/Plan (OT)    Planned Therapy Interventions (OT) activity tolerance training;adaptive equipment training;BADL retraining;cognitive/visual perception retraining;manual therapy/joint mobilization;IADL retraining;functional balance retraining;neuromuscular control/coordination retraining;edema control/reduction;occupation/activity based interventions;ROM/therapeutic exercise;patient/caregiver education/training;passive ROM/stretching;strengthening exercise;transfer/mobility retraining  -CM               User Key  (r) = Recorded By, (t) = Taken By, (c) = Cosigned By      Initials Name Provider Type    CM Rachell Steward OT Occupational Therapist                   Clinical Impression       Row Name 08/23/23 1510 08/23/23 0848       Pain Assessment    Pretreatment Pain Rating 0/10 - no pain  -CM 3/10  -CM    Posttreatment Pain Rating 0/10 - no pain  -CM --    Pain Location - -- shoulder;abdomen  -CM    Pain Intervention(s) -- Ambulation/increased activity;Repositioned;Distraction;Medication (See MAR)  -CM      Row Name 08/23/23 1510 08/23/23 0848       Plan of Care Review    Plan of Care Reviewed With patient;family  -CM patient;family  -CM    Outcome Evaluation Skilled  IP OT session completed. Pt sitting up in bed upon arrival. Alert and agreeable to therapy. Pt was Mod I for bed mobility. SBA for STS with FWW. CGA for mobility in room and halls. Pt left supine in bed with exit alarm on and all needs in reach. Cont OT POC. One goal met.  -CM OT eval completed. Co-eval with PT. Pt sitting up in bed upon arrival. Alert and agreeable to therapy. Pt is (I) with ADLS and mobility at baseline. Pt lives at home alone,  however, family with be staying with her at d/c until she is able to take care of herself. During session, pt was Mod I for bed mobility. SBA for STS with FWW. CGA for mobility in room and halls with FWW. SBA toilet t/f. SBA hand hygiene standing at the sink. (I) toileting and donning/doffing socks. Pt was left supine in bed with exit alarm on and all needs in reach. Pt presents with decreased endurnace and activity tolerance, impairing her (I) and safety with ADLs and mobility. IP OT will follow. Goals established. Anticiapte d/c home with assist as needed.  -CM      Row Name 08/23/23 1510 08/23/23 0848       Therapy Assessment/Plan (OT)    Patient/Family Therapy Goal Statement (OT) -- return home  -CM    Rehab Potential (OT) good, to achieve stated therapy goals  -CM good, to achieve stated therapy goals  -CM    Criteria for Skilled Therapeutic Interventions Met (OT) yes;meets criteria  -CM yes;meets criteria  -CM    Therapy Frequency (OT) 2 times/day  -CM 2 times/day  -CM    Predicted Duration of Therapy Intervention (OT) -- until d/c or all goals met  -CM      Row Name 08/23/23 1510 08/23/23 0848       Therapy Plan Review/Discharge Plan (OT)    Anticipated Discharge Disposition (OT) home with assist  -CM home with assist  -CM      Row Name 08/23/23 0848          Vital Signs    Pre Systolic BP Rehab 158  Manual  -CM     Pre Treatment Diastolic BP 78  -CM     Post Systolic BP Rehab 182  Manual  -CM     Post Treatment Diastolic BP 78  -CM     Pretreatment Heart Rate (beats/min) 66  -CM     Posttreatment Heart Rate (beats/min) 62  -CM     Pre SpO2 (%) 100  -CM     O2 Delivery Pre Treatment room air  -CM     Post SpO2 (%) 99  -CM     Pre Patient Position Supine  -CM     Post Patient Position Supine  -CM       Row Name 08/23/23 1510 08/23/23 0848       Positioning and Restraints    Pre-Treatment Position in bed  -CM in bed  -CM    Post Treatment Position bed  -CM bed  -CM    In Bed supine;call light within  reach;encouraged to call for assist;exit alarm on;with family/caregiver;side rails up x2  -CM call light within reach;encouraged to call for assist;exit alarm on;with family/caregiver;side rails up x2  -CM              User Key  (r) = Recorded By, (t) = Taken By, (c) = Cosigned By      Initials Name Provider Type    Rachell Key OT Occupational Therapist                   Outcome Measures       Row Name 08/23/23 1510 08/23/23 0848       How much help from another is currently needed...    Putting on and taking off regular lower body clothing? 4  -CM 4  -CM    Bathing (including washing, rinsing, and drying) 3  -CM 3  -CM    Toileting (which includes using toilet bed pan or urinal) 4  -CM 4  -CM    Putting on and taking off regular upper body clothing 4  -CM 4  -CM    Taking care of personal grooming (such as brushing teeth) 4  -CM 4  -CM    Eating meals 4  -CM 4  -CM    AM-PAC 6 Clicks Score (OT) 23  -CM 23  -CM      Row Name 08/23/23 1000 08/23/23 0849       How much help from another person do you currently need...    Turning from your back to your side while in flat bed without using bedrails? 4  -DN 4  -MM    Moving from lying on back to sitting on the side of a flat bed without bedrails? 4  -DN 4  -MM    Moving to and from a bed to a chair (including a wheelchair)? 3  -DN 3  -MM    Standing up from a chair using your arms (e.g., wheelchair, bedside chair)? 3  -DN 3  -MM    Climbing 3-5 steps with a railing? 1  -DN 2  -MM    To walk in hospital room? 3  -DN 3  -MM    AM-PAC 6 Clicks Score (PT) 18  -DN 19  -MM    Highest level of mobility 6 --> Walked 10 steps or more  -DN 6 --> Walked 10 steps or more  -MM      Row Name 08/23/23 1510 08/23/23 0849       Functional Assessment    Outcome Measure Options AM-PAC 6 Clicks Daily Activity (OT)  -CM AM-PAC 6 Clicks Basic Mobility (PT)  -MM      Row Name 08/23/23 0848          Functional Assessment    Outcome Measure Options AM-PAC 6 Clicks Daily Activity (OT)  -CM                User Key  (r) = Recorded By, (t) = Taken By, (c) = Cosigned By      Initials Name Provider Type    Ara Cai, RN Registered Nurse    Rachell Key, OT Occupational Therapist    MM Ama Jimenez, PT Physical Therapist                    Occupational Therapy Education       Title: PT OT SLP Therapies (In Progress)       Topic: Occupational Therapy (In Progress)       Point: ADL training (Done)       Description:   Instruct learner(s) on proper safety adaptation and remediation techniques during self care or transfers.   Instruct in proper use of assistive devices.                  Learning Progress Summary             Patient Acceptance, E,TB, VU by CM at 8/23/2023 1236    Comment: OT POC, Role of OT, d/c recommendations   Family Acceptance, E,TB, VU by CM at 8/23/2023 1236    Comment: OT POC, Role of OT, d/c recommendations                         Point: Home exercise program (Not Started)       Description:   Instruct learner(s) on appropriate technique for monitoring, assisting and/or progressing therapeutic exercises/activities.                  Learner Progress:  Not documented in this visit.              Point: Precautions (Done)       Description:   Instruct learner(s) on prescribed precautions during self-care and functional transfers.                  Learning Progress Summary             Patient Acceptance, E,TB, VU by CM at 8/23/2023 1236    Comment: OT POC, Role of OT, d/c recommendations   Family Acceptance, E,TB, VU by CM at 8/23/2023 1236    Comment: OT POC, Role of OT, d/c recommendations                         Point: Body mechanics (Done)       Description:   Instruct learner(s) on proper positioning and spine alignment during self-care, functional mobility activities and/or exercises.                  Learning Progress Summary             Patient Acceptance, E,TB, VU by CM at 8/23/2023 1236    Comment: OT POC, Role of OT, d/c recommendations   Family  Acceptance, E,TB, VU by MARINA at 8/23/2023 1236    Comment: OT POC, Role of OT, d/c recommendations                                         User Key       Initials Effective Dates Name Provider Type Discipline    MARINA 11/18/22 -  Rachell Steward OT Occupational Therapist OT                  OT Recommendation and Plan  Planned Therapy Interventions (OT): activity tolerance training, adaptive equipment training, BADL retraining, cognitive/visual perception retraining, manual therapy/joint mobilization, IADL retraining, functional balance retraining, neuromuscular control/coordination retraining, edema control/reduction, occupation/activity based interventions, ROM/therapeutic exercise, patient/caregiver education/training, passive ROM/stretching, strengthening exercise, transfer/mobility retraining  Therapy Frequency (OT): 2 times/day  Plan of Care Review  Plan of Care Reviewed With: patient, family  Outcome Evaluation: Skilled  IP OT session completed. Pt sitting up in bed upon arrival. Alert and agreeable to therapy. Pt was Mod I for bed mobility. SBA for STS with FWW. CGA for mobility in room and halls. Pt left supine in bed with exit alarm on and all needs in reach. Cont OT POC. One goal met.     Time Calculation:   Evaluation Complexity (OT)  Review Occupational Profile/Medical/Therapy History Complexity: expanded/moderate complexity  Assessment, Occupational Performance/Identification of Deficit Complexity: 3-5 performance deficits  Clinical Decision Making Complexity (OT): detailed assessment/moderate complexity  Overall Complexity of Evaluation (OT): moderate complexity     Time Calculation- OT       Row Name 08/23/23 1540 08/23/23 1237          Time Calculation- OT    OT Start Time 1510  -CM 0848  -CM     OT Stop Time 1533  -CM 0928  -CM     OT Time Calculation (min) 23 min  -CM 40 min  -CM     Total Timed Code Minutes- OT 23 minute(s)  -CM --     OT Received On 08/23/23  -CM 08/23/23  -CM     OT Goal Re-Cert Due  Date -- 09/05/23  -CM        Timed Charges    99359 - OT Therapeutic Activity Minutes 23  -CM --        Untimed Charges    OT Eval/Re-eval Minutes -- 40  -CM        Total Minutes    Timed Charges Total Minutes 23  -CM --     Untimed Charges Total Minutes -- 40  -CM      Total Minutes 23  -CM 40  -CM               User Key  (r) = Recorded By, (t) = Taken By, (c) = Cosigned By      Initials Name Provider Type    Rachell Key OT Occupational Therapist                  Therapy Charges for Today       Code Description Service Date Service Provider Modifiers Qty    90607774407 HC OT EVAL MOD COMPLEXITY 3 8/23/2023 Rachell Steward OT GO 1    [id number]  OT THERAPEUTIC ACT EA 15 MIN 8/23/2023 Rachell Steward OT GO 2                 Rachell Steward OT  8/23/2023

## 2023-08-23 NOTE — PLAN OF CARE
Goal Outcome Evaluation:  Plan of Care Reviewed With: patient, family           Outcome Evaluation: Skilled  IP OT session completed. Pt sitting up in bed upon arrival. Alert and agreeable to therapy. Pt was Mod I for bed mobility. SBA for STS with FWW. CGA for mobility in room and halls. Pt left supine in bed with exit alarm on and all needs in reach. Cont OT POC. One goal met.      Anticipated Discharge Disposition (OT): home with assist

## 2023-08-23 NOTE — PLAN OF CARE
Goal Outcome Evaluation:  Plan of Care Reviewed With: patient, family           Outcome Evaluation: PT evaluation completed, co-eval with OT. Patient AO X 4 and agreeable to therapy. Patient was able to supine<>sit and scoot with Mod I, sit>stand and bed>chair transfer with SBA. Patient was able to ambulate 5' X 1 and 200' X 1 with FWW and CGA. RN notified of BP increasing to 182/78. Goals created, continue skilled IP PT. Recommend patient d/c home with assist.      Anticipated Discharge Disposition (PT): home with assist

## 2023-08-23 NOTE — PLAN OF CARE
Goal Outcome Evaluation:  Plan of Care Reviewed With: patient        Progress: improving  Outcome Evaluation: Pt. was mod(I) for bed mobility and was sba for transfers and gait this tx. Pt. amb. 150' and then 225' with use of a RW and took a standing rest break between gait trips.  Pt. also asc/desc 3 steps with sba and use of B handrails.  Pt. met 1 new goal this tx. and improving well.

## 2023-08-24 LAB
BH BB BLOOD EXPIRATION DATE: NORMAL
BH BB BLOOD EXPIRATION DATE: NORMAL
BH BB BLOOD TYPE BARCODE: 6200
BH BB BLOOD TYPE BARCODE: 6200
BH BB DISPENSE STATUS: NORMAL
BH BB DISPENSE STATUS: NORMAL
BH BB PRODUCT CODE: NORMAL
BH BB PRODUCT CODE: NORMAL
BH BB UNIT NUMBER: NORMAL
BH BB UNIT NUMBER: NORMAL
CROSSMATCH INTERPRETATION: NORMAL
CROSSMATCH INTERPRETATION: NORMAL
UNIT  ABO: NORMAL
UNIT  ABO: NORMAL
UNIT  RH: NORMAL
UNIT  RH: NORMAL

## 2023-08-24 PROCEDURE — 97110 THERAPEUTIC EXERCISES: CPT

## 2023-08-24 PROCEDURE — 97535 SELF CARE MNGMENT TRAINING: CPT

## 2023-08-24 PROCEDURE — 25010000002 ONDANSETRON PER 1 MG: Performed by: SURGERY

## 2023-08-24 PROCEDURE — 25010000002 HEPARIN (PORCINE) PER 1000 UNITS: Performed by: SURGERY

## 2023-08-24 RX ADMIN — OXYCODONE HYDROCHLORIDE 5 MG: 5 TABLET ORAL at 21:29

## 2023-08-24 RX ADMIN — HEPARIN SODIUM 5000 UNITS: 5000 INJECTION INTRAVENOUS; SUBCUTANEOUS at 21:29

## 2023-08-24 RX ADMIN — FAMOTIDINE 20 MG: 20 TABLET ORAL at 08:45

## 2023-08-24 RX ADMIN — POTASSIUM CHLORIDE, DEXTROSE MONOHYDRATE AND SODIUM CHLORIDE 50 ML/HR: 150; 5; 450 INJECTION, SOLUTION INTRAVENOUS at 09:35

## 2023-08-24 RX ADMIN — HEPARIN SODIUM 5000 UNITS: 5000 INJECTION INTRAVENOUS; SUBCUTANEOUS at 08:46

## 2023-08-24 RX ADMIN — ACETAMINOPHEN 1000 MG: 500 TABLET, FILM COATED ORAL at 09:56

## 2023-08-24 RX ADMIN — ONDANSETRON 4 MG: 2 INJECTION INTRAMUSCULAR; INTRAVENOUS at 13:16

## 2023-08-24 RX ADMIN — Medication 12.5 MG: at 09:29

## 2023-08-24 RX ADMIN — ALVIMOPAN 12 MG: 12 CAPSULE ORAL at 09:29

## 2023-08-24 RX ADMIN — ACETAMINOPHEN 1000 MG: 500 TABLET, FILM COATED ORAL at 03:54

## 2023-08-24 RX ADMIN — Medication 12.5 MG: at 21:29

## 2023-08-24 RX ADMIN — ALVIMOPAN 12 MG: 12 CAPSULE ORAL at 21:31

## 2023-08-24 RX ADMIN — FAMOTIDINE 20 MG: 20 TABLET ORAL at 21:29

## 2023-08-24 NOTE — PLAN OF CARE
Problem: Adult Inpatient Plan of Care  Goal: Plan of Care Review  Recent Flowsheet Documentation  Taken 8/24/2023 1343 by Laura He, JAMMIE  Outcome Evaluation: pt perf sit to stand act with ind  standing balance task with ind during adl  pt perf bathing ub with ind and lb with ind  donned hospital gown and doffed underwear with ind and socks with ind   Goal Outcome Evaluation:

## 2023-08-24 NOTE — THERAPY TREATMENT NOTE
Patient Name: Tim Fraser  : 1945    MRN: [id number]                              Today's Date: 2023       Admit Date: 2023    Visit Dx:     ICD-10-CM ICD-9-CM   1. Impaired mobility and ADLs  Z74.09 V49.89    Z78.9    2. Malignant neoplasm of transverse colon  C18.4 153.1   3. Impaired functional mobility, balance, gait, and endurance [Z74.09 (ICD-10-CM)]  Z74.09 V49.89     Patient Active Problem List   Diagnosis    Pseudophakia    Dry eye    Gastrointestinal bleeding    Malignant neoplasm of transverse colon    Colon cancer     Past Medical History:   Diagnosis Date    Artificial lens present     in position - both    Cancer     Cataract     Conjunctivitis     OTHER - RESOLVED    Keratoconjunctivitis sicca     Pain in eye     Presbyopia     Vision disturbance     OTHER, PHOTPSIA    Visual distortions of shape and size     Vitreous detachment of left eye      Past Surgical History:   Procedure Laterality Date    CATARACT EXTRACTION      Remove cataract, insert lens (Cataract extraction with intraocular lens implantation of the right eye)    COLONOSCOPY N/A 2023    Procedure: COLONOSCOPY 10:00;  Surgeon: Sigifredo Zapata DO;  Location: [facility] ENDOSCOPY;  Service: Gastroenterology;  Laterality: N/A;  tatoo at transverse mass    ENDOSCOPY N/A 2023    Procedure: ESOPHAGOGASTRODUODENOSCOPY 10:00;  Surgeon: Sigifredo Zapata DO;  Location: [facility] ENDOSCOPY;  Service: Gastroenterology;  Laterality: N/A;    EYE SURGERY  11/15/2010    AFTER CATARACT LASER SURGERY 40898 (YAG laser capsulotomy, left eye.)      General Information       Row Name 23 1427 23 1423       OT Time and Intention    Document Type therapy note (daily note)  -LM therapy note (daily note)  -LM    Mode of Treatment individual therapy  -LM individual therapy;occupational therapy  -LM      Row Name 23 0955          OT Time and Intention    Document Type therapy note (daily note)  -LM     Mode of  Treatment individual therapy  -LM               User Key  (r) = Recorded By, (t) = Taken By, (c) = Cosigned By      Initials Name Provider Type    Laura Rushing COTA Occupational Therapist Assistant                     Mobility/ADL's       Row Name 08/24/23 1427 08/24/23 0955       Bed Mobility    Bed Mobility bed mobility (all) activities  -LM bed mobility (all) activities  -LM    All Activities, [place] (Bed Mobility) modified independence  -LM --    Supine-Sit [place] (Bed Mobility) modified independence  -LM --    Sit-Supine [place] (Bed Mobility) modified independence  -LM --      Row Name 08/24/23 1336          Activities of Daily Living    BADL Assessment/Intervention bathing;lower body dressing;upper body dressing;grooming  -LM       Row Name 08/24/23 1336          Lower Body Dressing Assessment/Training    [place] Level (Lower Body Dressing) lower body dressing skills;doff;don;pants/bottoms;shoes/slippers;socks;independent  -LM       Row Name 08/24/23 1336          Bathing Assessment/Intervention    [place] Level (Bathing) bathing skills;lower body;upper body;upper extremities;distal lower extremities/feet;chest/trunk;proximal lower extremities;perineal area;independent  -LM       Row Name 08/24/23 1336          Upper Body Dressing Assessment/Training    [place] Level (Upper Body Dressing) upper body dressing skills;doff;don;independent  -LM       Row Name 08/24/23 1336          Grooming Assessment/Training    [place] Level (Grooming) grooming skills;wash face, hands;independent  -LM               User Key  (r) = Recorded By, (t) = Taken By, (c) = Cosigned By      Initials Name Provider Type    Laura Rushing COTA Occupational Therapist Assistant                   Obj/Interventions       Row Name 08/24/23 1428          Motor Skills    Motor Skills coordination;functional endurance  -LM     Therapeutic Exercise shoulder;elbow/forearm  2 lb wt elbow flex and  pron sup and arom in all other planes b ue one ue at a time  -LM               User Key  (r) = Recorded By, (t) = Taken By, (c) = Cosigned By      Initials Name Provider Type    Laura Rushing COTA Occupational Therapist Assistant                   Goals/Plan       Row Name 08/24/23 1431 08/24/23 1343       Transfer Goal 1 (OT)    Activity/Assistive Device (Transfer Goal 1, OT) toilet  -LM toilet  -LM    [place] Level/Cues Needed (Transfer Goal 1, OT) standby assist  -LM standby assist  -LM    Time Frame (Transfer Goal 1, OT) long term goal (LTG);by discharge  -LM long term goal (LTG);by discharge  -LM    Progress/Outcome (Transfer Goal 1, OT) goal not met  -LM goal not met  -LM      Row Name 08/24/23 1341          Transfer Goal 1 (OT)    Activity/Assistive Device (Transfer Goal 1, OT) toilet  -LM     [place] Level/Cues Needed (Transfer Goal 1, OT) standby assist  -LM     Time Frame (Transfer Goal 1, OT) long term goal (LTG);by discharge  -LM     Progress/Outcome (Transfer Goal 1, OT) goal not met  -LM       Row Name 08/24/23 1431 08/24/23 1343       Bathing Goal 1 (OT)    Activity/Device (Bathing Goal 1, OT) bathing skills, all  -LM bathing skills, all  -LM    [place] Level/Cues Needed (Bathing Goal 1, OT) standby assist  -LM standby assist  -LM    Time Frame (Bathing Goal 1, OT) long term goal (LTG);by discharge  -LM long term goal (LTG);by discharge  -LM    Progress/Outcomes (Bathing Goal 1, OT) goal met  -LM goal met  -LM      Row Name 08/24/23 1341          Bathing Goal 1 (OT)    Activity/Device (Bathing Goal 1, OT) bathing skills, all  -LM     [place] Level/Cues Needed (Bathing Goal 1, OT) standby assist  -LM     Time Frame (Bathing Goal 1, OT) long term goal (LTG);by discharge  -LM     Progress/Outcomes (Bathing Goal 1, OT) goal met  -LM       Row Name 08/24/23 1431 08/24/23 1343       Problem Specific Goal 1 (OT)    Problem Specific Goal 1 (OT) Pt will tolerate at least 15 minutes  OOB ADLs/mobility with VSS and prn rest breaks for increased endurance, safety, and (I) with ADLs and mobiltiy.  -LM Pt will tolerate at least 15 minutes OOB ADLs/mobility with VSS and prn rest breaks for increased endurance, safety, and (I) with ADLs and mobiltiy.  -LM    Time Frame (Problem Specific Goal 1, OT) long term goal (LTG);by discharge  -LM long term goal (LTG);by discharge  -LM    Progress/Outcome (Problem Specific Goal 1, OT) goal met  -LM goal met  -LM      Row Name 08/24/23 1341          Problem Specific Goal 1 (OT)    Problem Specific Goal 1 (OT) Pt will tolerate at least 15 minutes OOB ADLs/mobility with VSS and prn rest breaks for increased endurance, safety, and (I) with ADLs and mobiltiy.  -LM     Time Frame (Problem Specific Goal 1, OT) long term goal (LTG);by discharge  -LM     Progress/Outcome (Problem Specific Goal 1, OT) goal met  -LM               User Key  (r) = Recorded By, (t) = Taken By, (c) = Cosigned By      Initials Name Provider Type    LM Laura He COTA Occupational Therapist Assistant                   Clinical Impression       Row Name 08/24/23 1429 08/24/23 1342       Pain Assessment    Pretreatment Pain Rating 4/10  -LM 3/10  -LM    Posttreatment Pain Rating 4/10  -LM 4/10  -LM    Pain Location - abdomen  -LM abdomen  -LM    Pre/Posttreatment Pain Comment -- request pain meds post OT  -LM    Pain Intervention(s) Medication (See MAR)  -LM --      Row Name 08/24/23 1432 08/24/23 1343       Plan of Care Review    Outcome Evaluation pt perf well with OT  sup to sit with mod I sitting eob with ind  perf bicep curls with 2 lb wt and sup pronation  pt also perf ther ex b ue with arom  -LM pt perf sit to stand act with ind  standing balance task with ind during adl  pt perf bathing ub with ind and lb with ind  donned hospital gown and doffed underwear with ind and socks with ind  -LM      Row Name 08/24/23 1429          Vital Signs    Intra Systolic BP Rehab 172  -LM      Intra Treatment Diastolic BP 92  -LM     Posttreatment Heart Rate (beats/min) 63  -LM     Post SpO2 (%) 100  -LM               User Key  (r) = Recorded By, (t) = Taken By, (c) = Cosigned By      Initials Name Provider Type    Laura Rushing COTA Occupational Therapist Assistant                   Outcome Measures       Row Name 08/24/23 1431 08/24/23 1343       How much help from another is currently needed...    Putting on and taking off regular lower body clothing? 4  -LM 4  -LM    Bathing (including washing, rinsing, and drying) 4  -LM 4  -LM    Toileting (which includes using toilet bed pan or urinal) 4  -LM 4  -LM    Putting on and taking off regular upper body clothing 4  -LM 4  -LM    Taking care of personal grooming (such as brushing teeth) 4  -LM 4  -LM    Eating meals 4  -LM 4  -LM    AM-PAC 6 Clicks Score (OT) 24  -LM 24  -LM      Row Name 08/24/23 0800          How much help from another person do you currently need...    Turning from your back to your side while in flat bed without using bedrails? 4  -DN     Moving from lying on back to sitting on the side of a flat bed without bedrails? 4  -DN     Moving to and from a bed to a chair (including a wheelchair)? 3  -DN     Standing up from a chair using your arms (e.g., wheelchair, bedside chair)? 3  -DN     Climbing 3-5 steps with a railing? 1  -DN     To walk in hospital room? 3  -DN     AM-PAC 6 Clicks Score (PT) 18  -DN     Highest level of mobility 6 --> Walked 10 steps or more  -DN               User Key  (r) = Recorded By, (t) = Taken By, (c) = Cosigned By      Initials Name Provider Type    Ara Cai, SARAH Registered Nurse    Laura Rushing COTA Occupational Therapist Assistant                    Occupational Therapy Education       Title: PT OT SLP Therapies (In Progress)       Topic: Occupational Therapy (In Progress)       Point: ADL training (Done)       Description:   Instruct learner(s) on proper safety  adaptation and remediation techniques during self care or transfers.   Instruct in proper use of assistive devices.                  Learning Progress Summary             Patient Acceptance, E,TB, VU by CM at 8/23/2023 1236    Comment: OT POC, Role of OT, d/c recommendations   Family Acceptance, E,TB, VU by CM at 8/23/2023 1236    Comment: OT POC, Role of OT, d/c recommendations                         Point: Home exercise program (Not Started)       Description:   Instruct learner(s) on appropriate technique for monitoring, assisting and/or progressing therapeutic exercises/activities.                  Learner Progress:  Not documented in this visit.              Point: Precautions (Done)       Description:   Instruct learner(s) on prescribed precautions during self-care and functional transfers.                  Learning Progress Summary             Patient Acceptance, E,TB, VU by CM at 8/23/2023 1236    Comment: OT POC, Role of OT, d/c recommendations   Family Acceptance, E,TB, VU by CM at 8/23/2023 1236    Comment: OT POC, Role of OT, d/c recommendations                         Point: Body mechanics (Done)       Description:   Instruct learner(s) on proper positioning and spine alignment during self-care, functional mobility activities and/or exercises.                  Learning Progress Summary             Patient Acceptance, E,TB, VU by CM at 8/23/2023 1236    Comment: OT POC, Role of OT, d/c recommendations   Family Acceptance, E,TB, VU by CM at 8/23/2023 1236    Comment: OT POC, Role of OT, d/c recommendations                                         User Key       Initials Effective Dates Name Provider Type Discipline     11/18/22 -  Rachell Steward OT Occupational Therapist OT                  OT Recommendation and Plan     Plan of Care Review  Outcome Evaluation: pt perf well with OT  sup to sit with mod I sitting eob with ind  perf bicep curls with 2 lb wt and sup pronation  pt also perf ther ex b ue  with arom     Time Calculation:         Time Calculation- OT       Row Name 08/24/23 1414 08/24/23 0954          Time Calculation- OT    OT Start Time 1408  -LM 0935  -LM     OT Stop Time 1433  -LM 1004  -LM     OT Time Calculation (min) 25 min  -LM 29 min  -LM     Total Timed Code Minutes- OT 25 minute(s)  -LM 29 minute(s)  -LM     OT Received On 08/24/23  -LM 08/24/23  -LM        Timed Charges    17161 - OT Therapeutic Exercise Minutes 25  -LM --     25381 - OT Self Care/Mgmt Minutes -- 29  -LM        Total Minutes    Timed Charges Total Minutes 25  -LM 29  -LM      Total Minutes 25  -LM 29  -LM               User Key  (r) = Recorded By, (t) = Taken By, (c) = Cosigned By      Initials Name Provider Type    LM Luara He COTA Occupational Therapist Assistant                  Therapy Charges for Today       Code Description Service Date Service Provider Modifiers Qty    18967431224 HC OT SELF CARE/MGMT/TRAIN EA 15 MIN 8/24/2023 Laura He COTA GO 2    62579459321 HC OT THER PROC EA 15 MIN 8/24/2023 Laura He COTA GO 2                 JAMMIE Whiting  8/24/2023

## 2023-08-24 NOTE — PLAN OF CARE
Goal Outcome Evaluation:  Plan of Care Reviewed With: patient        Progress: improving  Outcome Evaluation: VSS, pt ambulating well with SB assist, pt urinating adequately, incision CDI, Prevena functioning appropriately, family at bedside, pain managed with meds per orders per pt request, no other complaints.  Tolerating diet.

## 2023-08-24 NOTE — SIGNIFICANT NOTE
08/24/23 1535   OTHER   Discipline physical therapy assistant   Rehab Time/Intention   Session Not Performed patient unavailable for treatment  (Checked on pt. multiple times this date.  Pt. with OT this am and checked back before lunch and pt. just got back to bed and did not feel up to getting back up.  Checked back for PM tx. and OT with pt. for second tx.)

## 2023-08-24 NOTE — PLAN OF CARE
Goal Outcome Evaluation:              Outcome Evaluation: pt perf well with OT  sup to sit with mod I sitting eob with ind  perf bicep curls with 2 lb wt and sup pronation  pt also perf ther ex b ue with arom  pt perf adl this am perf with ind vasquez with some standing act lb dressing

## 2023-08-24 NOTE — PROGRESS NOTES
"  Subjective:  Postop day 2 left colon resection.  Slight nausea when took her oral pain pill.  No other nausea no vomiting.  Tolerating her diet.  No BM no flatus yet.     /70 (BP Location: Left arm, Patient Position: Lying)   Pulse 66   Temp 97.9 °F (36.6 °C) (Tympanic)   Resp 18   Ht 160 cm (63\")   Wt 59 kg (130 lb)   SpO2 94%   BMI 23.03 kg/m²     Lab Results (last 24 hours)       Procedure Component Value Units Date/Time    Basic Metabolic Panel [[id number]]  (Abnormal) Collected: 08/23/23 0606    Specimen: Blood Updated: 08/23/23 0714     Glucose 128 mg/dL      BUN 6 mg/dL      Creatinine 0.88 mg/dL      Sodium 132 mmol/L      Potassium 4.2 mmol/L      Chloride 102 mmol/L      CO2 21.0 mmol/L      Calcium 7.9 mg/dL      BUN/Creatinine Ratio 6.8     Anion Gap 9.0 mmol/L      eGFR 67.4 mL/min/1.73     Narrative:      GFR Normal >60  Chronic Kidney Disease <60  Kidney Failure <15    The GFR formula is only valid for adults with stable renal function between ages 18 and 70.    CBC & Differential [[id number]]  (Abnormal) Collected: 08/23/23 0606    Specimen: Blood Updated: 08/23/23 0702    Narrative:      The following orders were created for panel order CBC & Differential.  Procedure                               Abnormality         Status                     ---------                               -----------         ------                     CBC Auto Differential[[id number]]        Abnormal            Final result                 Please view results for these tests on the individual orders.    CBC Auto Differential [[id number]]  (Abnormal) Collected: 08/23/23 0606    Specimen: Blood Updated: 08/23/23 0702     WBC 10.95 10*3/mm3      RBC 3.63 10*6/mm3      Hemoglobin 9.9 g/dL      Hematocrit 30.7 %      MCV 84.6 fL      MCH 27.3 pg      MCHC 32.2 g/dL      RDW 13.2 %      RDW-SD 40.5 fl      MPV 10.4 fL      Platelets 281 10*3/mm3      Neutrophil % 79.3 %      Lymphocyte % 12.3 %      Monocyte % 7.5 %  "     Eosinophil % 0.0 %      Basophil % 0.3 %      Immature Grans % 0.6 %      Neutrophils, Absolute 8.68 10*3/mm3      Lymphocytes, Absolute 1.35 10*3/mm3      Monocytes, Absolute 0.82 10*3/mm3      Eosinophils, Absolute 0.00 10*3/mm3      Basophils, Absolute 0.03 10*3/mm3      Immature Grans, Absolute 0.07 10*3/mm3      nRBC 0.0 /100 WBC             Current Medications:  Current Facility-Administered Medications   Medication Dose Route Frequency Provider Last Rate Last Admin    acetaminophen (TYLENOL) tablet 1,000 mg  1,000 mg Oral Q6H Sarmad Bateman MD   1,000 mg at 08/24/23 0354    alvimopan (ENTEREG) capsule 12 mg  12 mg Oral BID Sarmad Bateman MD   12 mg at 08/23/23 2123    atenolol (TENORMIN) half tablet 12.5 mg  12.5 mg Oral Q12H Luis A Pascal MD   12.5 mg at 08/23/23 2042    dextrose 5 % and sodium chloride 0.45 % with KCl 20 mEq/L infusion  50 mL/hr Intravenous Continuous Sarmad Bateman MD 50 mL/hr at 08/23/23 1426 50 mL/hr at 08/23/23 1426    electrolyte-A (PLASMALYTE-A) solution 1,000 mL  1,000 mL Intravenous Continuous PRN Sarmad Bateman MD 25 mL/hr at 08/22/23 0710 New Bag at 08/22/23 0935    famotidine (PEPCID) tablet 20 mg  20 mg Oral BID Sarmad Bateman MD   20 mg at 08/23/23 2041    heparin (porcine) 5000 UNIT/ML injection 5,000 Units  5,000 Units Subcutaneous Q12H Sarmad Bateman MD   5,000 Units at 08/23/23 2042    HYDROmorphone (DILAUDID) injection 0.5 mg  0.5 mg Intravenous Q2H PRN Sarmad Bateman MD        And    naloxone (NARCAN) injection 0.4 mg  0.4 mg Intravenous Q5 Min PRN Sarmad Bateman MD        nitroglycerin (NITROSTAT) SL tablet 0.4 mg  0.4 mg Sublingual Q5 Min PRN Sarmad Bateman MD        ondansetron (ZOFRAN) injection 4 mg  4 mg Intravenous Q6H PRN Sarmad Bateman MD   4 mg at 08/23/23 1826    oxyCODONE (ROXICODONE) immediate release tablet 5 mg  5 mg Oral Q4H PRN Sarmad Bateman MD   5 mg at 08/23/23 1244       Prior to admission  medications:  Medications Prior to Admission   Medication Sig Dispense Refill Last Dose    atenolol (TENORMIN) 25 MG tablet Take 0.5 tablets by mouth 2 (Two) Times a Day.   8/22/2023 at 0500    Calcium Carb-Cholecalciferol (Oyster Shell Calcium/D) 500-10 MG-MCG tablet tablet Take 1 tablet by mouth Daily.   Past Week    Cyanocobalamin (VITAMIN B 12 PO) Take 1,000 mg by mouth Daily. SL   Past Week    ferrous sulfate 325 (65 FE) MG tablet Take 1 tablet by mouth Every Night.   8/21/2023 at 1600    omeprazole (priLOSEC) 20 MG capsule Take 1 capsule by mouth Daily.   8/22/2023 at 0500    vitamin C (ASCORBIC ACID) 250 MG tablet Take 2 tablets by mouth Daily.   Past Week    vitamin D (ERGOCALCIFEROL) 1.25 MG (51284 UT) capsule capsule Take 1 capsule by mouth 1 (One) Time Per Week. TAKES EVERY WEDNESDAY   Past Week    polyethyl glycol-propyl glycol (SYSTANE) 0.4-0.3 % solution ophthalmic solution (artificial tears) Administer 1 drop to both eyes Daily As Needed.   Unknown       Physical exam: Alert and appropriate  Nontoxic  Abdomen soft  Dressing intact    Assessment : Overall doing well    Plan: Continue ERS protocol

## 2023-08-24 NOTE — PLAN OF CARE
Goal Outcome Evaluation:         Patient has had some pain this shift controlled with tylenol. Also had some nausea that was controlled with zofran. IV in left forearm was d/c after infiltration.

## 2023-08-24 NOTE — THERAPY TREATMENT NOTE
Patient Name: Tim Farser  : 1945    MRN: [id number]                              Today's Date: 2023       Admit Date: 2023    Visit Dx:     ICD-10-CM ICD-9-CM   1. Impaired mobility and ADLs  Z74.09 V49.89    Z78.9    2. Malignant neoplasm of transverse colon  C18.4 153.1   3. Impaired functional mobility, balance, gait, and endurance [Z74.09 (ICD-10-CM)]  Z74.09 V49.89     Patient Active Problem List   Diagnosis    Pseudophakia    Dry eye    Gastrointestinal bleeding    Malignant neoplasm of transverse colon    Colon cancer     Past Medical History:   Diagnosis Date    Artificial lens present     in position - both    Cancer     Cataract     Conjunctivitis     OTHER - RESOLVED    Keratoconjunctivitis sicca     Pain in eye     Presbyopia     Vision disturbance     OTHER, PHOTPSIA    Visual distortions of shape and size     Vitreous detachment of left eye      Past Surgical History:   Procedure Laterality Date    CATARACT EXTRACTION      Remove cataract, insert lens (Cataract extraction with intraocular lens implantation of the right eye)    COLONOSCOPY N/A 2023    Procedure: COLONOSCOPY 10:00;  Surgeon: Sigifredo Zapata DO;  Location: [facility] ENDOSCOPY;  Service: Gastroenterology;  Laterality: N/A;  tatoo at transverse mass    ENDOSCOPY N/A 2023    Procedure: ESOPHAGOGASTRODUODENOSCOPY 10:00;  Surgeon: Sigifredo Zapata DO;  Location: [facility] ENDOSCOPY;  Service: Gastroenterology;  Laterality: N/A;    EYE SURGERY  11/15/2010    AFTER CATARACT LASER SURGERY 68175 (YAG laser capsulotomy, left eye.)      General Information       Row Name 23 0955          OT Time and Intention    Document Type therapy note (daily note)  -LM     Mode of Treatment individual therapy  -LM               User Key  (r) = Recorded By, (t) = Taken By, (c) = Cosigned By      Initials Name Provider Type    LM Laura He COTA Occupational Therapist Assistant                     Mobility/ADL's        Row Name 08/24/23 0955          Bed Mobility    Bed Mobility bed mobility (all) activities  -LM       Row Name 08/24/23 1336          Activities of Daily Living    BADL Assessment/Intervention bathing;lower body dressing;upper body dressing;grooming  -LM       Row Name 08/24/23 1336          Lower Body Dressing Assessment/Training    [place] Level (Lower Body Dressing) lower body dressing skills;doff;don;pants/bottoms;shoes/slippers;socks;independent  -LM       Row Name 08/24/23 1336          Bathing Assessment/Intervention    [place] Level (Bathing) bathing skills;lower body;upper body;upper extremities;distal lower extremities/feet;chest/trunk;proximal lower extremities;perineal area;independent  -LM       Row Name 08/24/23 1336          Upper Body Dressing Assessment/Training    [place] Level (Upper Body Dressing) upper body dressing skills;doff;don;independent  -LM       Row Name 08/24/23 1336          Grooming Assessment/Training    [place] Level (Grooming) grooming skills;wash face, hands;independent  -LM               User Key  (r) = Recorded By, (t) = Taken By, (c) = Cosigned By      Initials Name Provider Type    LM Laura He COTA Occupational Therapist Assistant                   Obj/Interventions    No documentation.                  Goals/Plan       Row Name 08/24/23 1343 08/24/23 1341       Transfer Goal 1 (OT)    Activity/Assistive Device (Transfer Goal 1, OT) toilet  -LM toilet  -LM    [place] Level/Cues Needed (Transfer Goal 1, OT) standby assist  -LM standby assist  -LM    Time Frame (Transfer Goal 1, OT) long term goal (LTG);by discharge  -LM long term goal (LTG);by discharge  -LM    Progress/Outcome (Transfer Goal 1, OT) goal not met  -LM goal not met  -LM      Row Name 08/24/23 1343 08/24/23 1341       Bathing Goal 1 (OT)    Activity/Device (Bathing Goal 1, OT) bathing skills, all  -LM bathing skills, all  -LM    [place] Level/Cues Needed (Bathing Goal  1, OT) standby assist  -LM standby assist  -LM    Time Frame (Bathing Goal 1, OT) long term goal (LTG);by discharge  -LM long term goal (LTG);by discharge  -LM    Progress/Outcomes (Bathing Goal 1, OT) goal met  -LM goal met  -LM      Row Name 08/24/23 1343 08/24/23 1341       Problem Specific Goal 1 (OT)    Problem Specific Goal 1 (OT) Pt will tolerate at least 15 minutes OOB ADLs/mobility with VSS and prn rest breaks for increased endurance, safety, and (I) with ADLs and mobiltiy.  -LM Pt will tolerate at least 15 minutes OOB ADLs/mobility with VSS and prn rest breaks for increased endurance, safety, and (I) with ADLs and mobiltiy.  -LM    Time Frame (Problem Specific Goal 1, OT) long term goal (LTG);by discharge  -LM long term goal (LTG);by discharge  -LM    Progress/Outcome (Problem Specific Goal 1, OT) goal met  -LM goal met  -LM              User Key  (r) = Recorded By, (t) = Taken By, (c) = Cosigned By      Initials Name Provider Type    LM Laura He COTA Occupational Therapist Assistant                   Clinical Impression       Row Name 08/24/23 1342          Pain Assessment    Pretreatment Pain Rating 3/10  -LM     Posttreatment Pain Rating 4/10  -LM     Pain Location - abdomen  -LM     Pre/Posttreatment Pain Comment request pain meds post OT  -LM       Row Name 08/24/23 1343          Plan of Care Review    Outcome Evaluation pt perf sit to stand act with ind  standing balance task with ind during adl  pt perf bathing ub with ind and lb with ind  donned hospital gown and doffed underwear with ind and socks with ind  -LM               User Key  (r) = Recorded By, (t) = Taken By, (c) = Cosigned By      Initials Name Provider Type    Laura Rushing COTA Occupational Therapist Assistant                   Outcome Measures       Row Name 08/24/23 1343          How much help from another is currently needed...    Putting on and taking off regular lower body clothing? 4  -LM     Bathing  (including washing, rinsing, and drying) 4  -LM     Toileting (which includes using toilet bed pan or urinal) 4  -LM     Putting on and taking off regular upper body clothing 4  -LM     Taking care of personal grooming (such as brushing teeth) 4  -LM     Eating meals 4  -LM     AM-PAC 6 Clicks Score (OT) 24  -LM       Row Name 08/24/23 0800          How much help from another person do you currently need...    Turning from your back to your side while in flat bed without using bedrails? 4  -DN     Moving from lying on back to sitting on the side of a flat bed without bedrails? 4  -DN     Moving to and from a bed to a chair (including a wheelchair)? 3  -DN     Standing up from a chair using your arms (e.g., wheelchair, bedside chair)? 3  -DN     Climbing 3-5 steps with a railing? 1  -DN     To walk in hospital room? 3  -DN     AM-PAC 6 Clicks Score (PT) 18  -DN     Highest level of mobility 6 --> Walked 10 steps or more  -DN               User Key  (r) = Recorded By, (t) = Taken By, (c) = Cosigned By      Initials Name Provider Type    Ara Cai, SARAH Registered Nurse    Laura Rushing COTA Occupational Therapist Assistant                    Occupational Therapy Education       Title: PT OT SLP Therapies (In Progress)       Topic: Occupational Therapy (In Progress)       Point: ADL training (Done)       Description:   Instruct learner(s) on proper safety adaptation and remediation techniques during self care or transfers.   Instruct in proper use of assistive devices.                  Learning Progress Summary             Patient Acceptance, E,TB, VU by CM at 8/23/2023 1236    Comment: OT POC, Role of OT, d/c recommendations   Family Acceptance, E,TB, VU by CM at 8/23/2023 1236    Comment: OT POC, Role of OT, d/c recommendations                         Point: Home exercise program (Not Started)       Description:   Instruct learner(s) on appropriate technique for monitoring, assisting and/or  progressing therapeutic exercises/activities.                  Learner Progress:  Not documented in this visit.              Point: Precautions (Done)       Description:   Instruct learner(s) on prescribed precautions during self-care and functional transfers.                  Learning Progress Summary             Patient Acceptance, E,TB, VU by CM at 8/23/2023 1236    Comment: OT POC, Role of OT, d/c recommendations   Family Acceptance, E,TB, VU by CM at 8/23/2023 1236    Comment: OT POC, Role of OT, d/c recommendations                         Point: Body mechanics (Done)       Description:   Instruct learner(s) on proper positioning and spine alignment during self-care, functional mobility activities and/or exercises.                  Learning Progress Summary             Patient Acceptance, E,TB, VU by CM at 8/23/2023 1236    Comment: OT POC, Role of OT, d/c recommendations   Family Acceptance, E,TB, VU by CM at 8/23/2023 1236    Comment: OT POC, Role of OT, d/c recommendations                                         User Key       Initials Effective Dates Name Provider Type Discipline     11/18/22 -  Rachell Steward, THOMAS Occupational Therapist OT                  OT Recommendation and Plan     Plan of Care Review  Outcome Evaluation: pt perf sit to stand act with ind  standing balance task with ind during adl  pt perf bathing ub with ind and lb with ind  donned hospital gown and doffed underwear with ind and socks with ind     Time Calculation:         Time Calculation- OT       Row Name 08/24/23 0954             Time Calculation- OT    OT Start Time 0935  -LM      OT Stop Time 1004  -LM      OT Time Calculation (min) 29 min  -LM      Total Timed Code Minutes- OT 29 minute(s)  -LM      OT Received On 08/24/23  -LM         Timed Charges    84824 - OT Self Care/Mgmt Minutes 29  -LM         Total Minutes    Timed Charges Total Minutes 29  -LM       Total Minutes 29  -LM                User Key  (r) = Recorded By,  (t) = Taken By, (c) = Cosigned By      Initials Name Provider Type     Laura He COTA Occupational Therapist Assistant                  Therapy Charges for Today       Code Description Service Date Service Provider Modifiers Qty    90046945016 HC OT SELF CARE/MGMT/TRAIN EA 15 MIN 8/24/2023 Laura He COTA GO 2                 JAMMIE Whiting  8/24/2023

## 2023-08-25 ENCOUNTER — READMISSION MANAGEMENT (OUTPATIENT)
Dept: CALL CENTER | Facility: HOSPITAL | Age: 78
End: 2023-08-25
Payer: MEDICARE

## 2023-08-25 VITALS
OXYGEN SATURATION: 97 % | RESPIRATION RATE: 18 BRPM | WEIGHT: 133.4 LBS | TEMPERATURE: 97.8 F | HEIGHT: 63 IN | HEART RATE: 71 BPM | SYSTOLIC BLOOD PRESSURE: 150 MMHG | BODY MASS INDEX: 23.64 KG/M2 | DIASTOLIC BLOOD PRESSURE: 66 MMHG

## 2023-08-25 PROCEDURE — 97535 SELF CARE MNGMENT TRAINING: CPT

## 2023-08-25 PROCEDURE — 97116 GAIT TRAINING THERAPY: CPT

## 2023-08-25 PROCEDURE — 25010000002 HYDRALAZINE PER 20 MG: Performed by: SURGERY

## 2023-08-25 PROCEDURE — 25010000002 HEPARIN (PORCINE) PER 1000 UNITS: Performed by: SURGERY

## 2023-08-25 RX ORDER — HYDRALAZINE HYDROCHLORIDE 20 MG/ML
10 INJECTION INTRAMUSCULAR; INTRAVENOUS EVERY 6 HOURS PRN
Status: DISCONTINUED | OUTPATIENT
Start: 2023-08-25 | End: 2023-08-25 | Stop reason: HOSPADM

## 2023-08-25 RX ORDER — HYDROCODONE BITARTRATE AND ACETAMINOPHEN 5; 325 MG/1; MG/1
1 TABLET ORAL EVERY 4 HOURS PRN
Qty: 15 TABLET | Refills: 0 | Status: SHIPPED | OUTPATIENT
Start: 2023-08-25

## 2023-08-25 RX ADMIN — FAMOTIDINE 20 MG: 20 TABLET ORAL at 07:56

## 2023-08-25 RX ADMIN — Medication 12.5 MG: at 07:56

## 2023-08-25 RX ADMIN — HYDRALAZINE HYDROCHLORIDE 10 MG: 20 INJECTION INTRAMUSCULAR; INTRAVENOUS at 06:10

## 2023-08-25 RX ADMIN — OXYCODONE HYDROCHLORIDE 5 MG: 5 TABLET ORAL at 07:57

## 2023-08-25 RX ADMIN — HEPARIN SODIUM 5000 UNITS: 5000 INJECTION INTRAVENOUS; SUBCUTANEOUS at 07:56

## 2023-08-25 RX ADMIN — OXYCODONE HYDROCHLORIDE 5 MG: 5 TABLET ORAL at 03:57

## 2023-08-25 NOTE — THERAPY TREATMENT NOTE
Patient Name: Tim Fraser  : 1945    MRN: [id number]                              Today's Date: 2023       Admit Date: 2023    Visit Dx:     ICD-10-CM ICD-9-CM   1. Impaired mobility and ADLs  Z74.09 V49.89    Z78.9    2. Malignant neoplasm of transverse colon  C18.4 153.1   3. Impaired functional mobility, balance, gait, and endurance [Z74.09 (ICD-10-CM)]  Z74.09 V49.89     Patient Active Problem List   Diagnosis    Pseudophakia    Dry eye    Gastrointestinal bleeding    Malignant neoplasm of transverse colon    Colon cancer     Past Medical History:   Diagnosis Date    Artificial lens present     in position - both    Cancer     Cataract     Conjunctivitis     OTHER - RESOLVED    Keratoconjunctivitis sicca     Pain in eye     Presbyopia     Vision disturbance     OTHER, PHOTPSIA    Visual distortions of shape and size     Vitreous detachment of left eye      Past Surgical History:   Procedure Laterality Date    CATARACT EXTRACTION      Remove cataract, insert lens (Cataract extraction with intraocular lens implantation of the right eye)    COLONOSCOPY N/A 2023    Procedure: COLONOSCOPY 10:00;  Surgeon: Sigifredo Zapata DO;  Location: [facility] ENDOSCOPY;  Service: Gastroenterology;  Laterality: N/A;  tatoo at transverse mass    ENDOSCOPY N/A 2023    Procedure: ESOPHAGOGASTRODUODENOSCOPY 10:00;  Surgeon: Sigifredo Zapata DO;  Location: [facility] ENDOSCOPY;  Service: Gastroenterology;  Laterality: N/A;    EYE SURGERY  11/15/2010    AFTER CATARACT LASER SURGERY 79200 (YAG laser capsulotomy, left eye.)      General Information       Row Name 23 1514          OT Time and Intention    Document Type therapy note (daily note)  -LW     Mode of Treatment individual therapy;occupational therapy  -LW       Row Name 23 1514          General Information    Patient Profile Reviewed yes  -LW       Row Name 23 1514          Cognition    Orientation Status (Cognition) oriented x 4   -       Row Name 08/25/23 1514          Safety Issues, Functional Mobility    Impairments Affecting Function (Mobility) endurance/activity tolerance;pain  -               User Key  (r) = Recorded By, (t) = Taken By, (c) = Cosigned By      Initials Name Provider Type    Dorothy Selby COTA Occupational Therapist Assistant                     Mobility/ADL's       Row Name 08/25/23 1514          Bed Mobility    Bed Mobility supine-sit  -LW     All Activities, [place] (Bed Mobility) independent  -     Assistive Device (Bed Mobility) head of bed elevated  -LW       Row Name 08/25/23 1514          Transfers    Transfers sit-stand transfer;stand-sit transfer  -       Row Name 08/25/23 1514          Sit-Stand Transfer    Sit-Stand [place] (Transfers) independent  -LW       Row Name 08/25/23 1514          Stand-Sit Transfer    Stand-Sit [place] (Transfers) independent  -       Row Name 08/25/23 1514          Activities of Daily Living    BADL Assessment/Intervention upper body dressing;lower body dressing  -       Row Name 08/25/23 1514          Lower Body Dressing Assessment/Training    [place] Level (Lower Body Dressing) lower body dressing skills;doff;don;pants/bottoms;shoes/slippers;socks;independent  -     Position (Lower Body Dressing) edge of bed sitting  -       Row Name 08/25/23 1514          Upper Body Dressing Assessment/Training    [place] Level (Upper Body Dressing) upper body dressing skills;doff;don;front opening garment;pull-over garment;independent  Hospital gown  -               User Key  (r) = Recorded By, (t) = Taken By, (c) = Cosigned By      Initials Name Provider Type    Dorothy Selby COTA Occupational Therapist Assistant                   Obj/Interventions    No documentation.                  Goals/Plan       Row Name 08/25/23 1125          Transfer Goal 1 (OT)    Activity/Assistive Device (Transfer Goal 1, OT) toilet;transfers, all  -LW      [place] Level/Cues Needed (Transfer Goal 1, OT) standby assist  -LW     Time Frame (Transfer Goal 1, OT) long term goal (LTG);by discharge  -LW     Progress/Outcome (Transfer Goal 1, OT) goal met   -LW       Row Name 08/25/23 1125          Bathing Goal 1 (OT)    Activity/Device (Bathing Goal 1, OT) bathing skills, all  -LW     [place] Level/Cues Needed (Bathing Goal 1, OT) standby assist  -LW     Time Frame (Bathing Goal 1, OT) long term goal (LTG);by discharge  -LW     Progress/Outcomes (Bathing Goal 1, OT) goal met  -LW       Row Name 08/25/23 1125          Problem Specific Goal 1 (OT)    Problem Specific Goal 1 (OT) Pt will tolerate at least 15 minutes OOB ADLs/mobility with VSS and prn rest breaks for increased endurance, safety, and (I) with ADLs and mobiltiy.  -LW     Time Frame (Problem Specific Goal 1, OT) long term goal (LTG);by discharge  -LW     Progress/Outcome (Problem Specific Goal 1, OT) goal met  -LW               User Key  (r) = Recorded By, (t) = Taken By, (c) = Cosigned By      Initials Name Provider Type    LW Dorothy Conrad COTA Occupational Therapist Assistant                   Clinical Impression       Row Name 08/25/23 1516          Pain Assessment    Pretreatment Pain Rating 0/10 - no pain  -LW     Posttreatment Pain Rating 0/10 - no pain  -LW       Row Name 08/25/23 1516          Plan of Care Review    Plan of Care Reviewed With patient  -LW     Progress improving  -LW     Outcome Evaluation Patient supine in bed. Supine to sit Sit to stand Independent. UB and LB dressing with [place]. Educated patient on Home safety. All needs in reach. Family in room.  -LW       Row Name 08/25/23 1516          Positioning and Restraints    Pre-Treatment Position in bed  -LW     Post Treatment Position bed  -LW     In Bed sitting EOB;call light within reach;encouraged to call for assist  -LW               User Key  (r) = Recorded By, (t) = Taken By, (c) = Cosigned By      Initials Name  Provider Type    Dorothy Selby COTA Occupational Therapist Assistant                   Outcome Measures       Row Name 08/25/23 1518          How much help from another is currently needed...    Putting on and taking off regular lower body clothing? 4  -LW     Bathing (including washing, rinsing, and drying) 4  -LW     Toileting (which includes using toilet bed pan or urinal) 4  -LW     Putting on and taking off regular upper body clothing 4  -LW     Taking care of personal grooming (such as brushing teeth) 4  -LW     Eating meals 4  -LW     AM-PAC 6 Clicks Score (OT) 24  -LW       Row Name 08/25/23 1100 08/25/23 0756       How much help from another person do you currently need...    Turning from your back to your side while in flat bed without using bedrails? 4  -JW 4  -LWA    Moving from lying on back to sitting on the side of a flat bed without bedrails? 4  -JW 4  -LWA    Moving to and from a bed to a chair (including a wheelchair)? 3  -JW 3  -LWA    Standing up from a chair using your arms (e.g., wheelchair, bedside chair)? 4  -JW 3  -LWA    Climbing 3-5 steps with a railing? 3  -JW 1  -LWA    To walk in hospital room? 3  -JW 3  -LWA    AM-PAC 6 Clicks Score (PT) 21  -JW 18  -LWA    Highest level of mobility 6 --> Walked 10 steps or more  -JW 6 --> Walked 10 steps or more  -A      Row Name 08/25/23 1100          Functional Assessment    Outcome Measure Options AM-PAC 6 Clicks Basic Mobility (PT)  -               User Key  (r) = Recorded By, (t) = Taken By, (c) = Cosigned By      Initials Name Provider Type    JW Bisi Miller, PTA Physical Therapist Assistant    Dorothy Selby COTA Occupational Therapist Assistant    Kate Sykes, RN Registered Nurse                    Occupational Therapy Education       Title: PT OT SLP Therapies (In Progress)       Topic: Occupational Therapy (Done)       Point: ADL training (Done)       Description:   Instruct learner(s) on proper safety  adaptation and remediation techniques during self care or transfers.   Instruct in proper use of assistive devices.                  Learning Progress Summary             Patient Acceptance, E,TB,H, VU by LW at 8/25/2023 1519    Comment: Educated patient on Home Safety.    Acceptance, E,TB, VU by CM at 8/23/2023 1236    Comment: OT POC, Role of OT, d/c recommendations   Family Acceptance, E,TB, VU by CM at 8/23/2023 1236    Comment: OT POC, Role of OT, d/c recommendations                         Point: Home exercise program (Done)       Description:   Instruct learner(s) on appropriate technique for monitoring, assisting and/or progressing therapeutic exercises/activities.                  Learning Progress Summary             Patient Acceptance, E,TB,H, VU by LW at 8/25/2023 1519    Comment: Educated patient on Home Safety.                         Point: Precautions (Done)       Description:   Instruct learner(s) on prescribed precautions during self-care and functional transfers.                  Learning Progress Summary             Patient Acceptance, E,TB,H, VU by LW at 8/25/2023 1519    Comment: Educated patient on Home Safety.    Acceptance, E,TB, VU by CM at 8/23/2023 1236    Comment: OT POC, Role of OT, d/c recommendations   Family Acceptance, E,TB, VU by CM at 8/23/2023 1236    Comment: OT POC, Role of OT, d/c recommendations                         Point: Body mechanics (Done)       Description:   Instruct learner(s) on proper positioning and spine alignment during self-care, functional mobility activities and/or exercises.                  Learning Progress Summary             Patient Acceptance, E,TB,H, VU by LW at 8/25/2023 1519    Comment: Educated patient on Home Safety.    Acceptance, E,TB, VU by CM at 8/23/2023 1236    Comment: OT POC, Role of OT, d/c recommendations   Family Acceptance, E,TB, VU by CM at 8/23/2023 1236    Comment: OT POC, Role of OT, d/c recommendations                                          User Key       Initials Effective Dates Name Provider Type Discipline    LW 06/16/21 -  Dorothy Conrad COTA Occupational Therapist Assistant OT    CM 11/18/22 -  Rachell Steward OT Occupational Therapist OT                  OT Recommendation and Plan     Plan of Care Review  Plan of Care Reviewed With: patient  Progress: improving  Outcome Evaluation: Patient supine in bed. Supine to sit Sit to stand Independent. UB and LB dressing with [place]. Educated patient on Home safety. All needs in reach. Family in room.     Time Calculation:         Time Calculation- OT       Row Name 08/25/23 1519             Time Calculation- OT    OT Start Time 1125  -LW      OT Stop Time 1150  -LW      OT Time Calculation (min) 25 min  -LW      Total Timed Code Minutes- OT 25 minute(s)  -LW      OT Received On 08/25/23  -LW         Timed Charges    91982 - OT Self Care/Mgmt Minutes 25  -LW         Total Minutes    Timed Charges Total Minutes 25  -LW       Total Minutes 25  -LW                User Key  (r) = Recorded By, (t) = Taken By, (c) = Cosigned By      Initials Name Provider Type    LW Dorothy Conrad COTA Occupational Therapist Assistant                  Therapy Charges for Today       Code Description Service Date Service Provider Modifiers Qty    79695010444 HC OT SELF CARE/MGMT/TRAIN EA 15 MIN 8/25/2023 Dorothy Conrad COTA GO 2                 JAMMIE Lane  8/25/2023

## 2023-08-25 NOTE — PLAN OF CARE
Goal Outcome Evaluation:  Plan of Care Reviewed With: patient, daughter           Outcome Evaluation: pt t/fers sit<>stand SBA/Ind, ambulates ~300' x 2 w/ no AD w/ CGA/SBA, performs B LE seated ther ex x20 reps, issued and reviewed home safety and HEP

## 2023-08-25 NOTE — PLAN OF CARE
Goal Outcome Evaluation:   Patient tolerating walking with stand by assistance. Complaints of pain. Prn medication given per order. Family at the bed side. Call light and phone in reach .Will continue  to monitor.

## 2023-08-25 NOTE — OUTREACH NOTE
Prep Survey      Flowsheet Row Responses   [facility] patient discharged from? [facility]   Is LACE score < 7 ? No   Eligibility Readm Mgmt   Discharge diagnosis Malignant neoplasm of transverse colon   Does the patient have one of the following disease processes/diagnoses(primary or secondary)? General Surgery   Does the patient have Home health ordered? No   Is there a DME ordered? No   Medication alerts for this patient [place]   General alerts for this patient COLON RESECTION LAPAROSCOPIC LEFT, CONVERTED TO OPEN   Prep survey completed? Yes            Madison SOLIZ - Registered Nurse

## 2023-08-25 NOTE — DISCHARGE SUMMARY
Discharge Summary    Date of Admission: 8/22/2023  Date of Discharge:  8/25/2023  Service: General Surgery  Attending: Sarmad Bateamn    Procedures Performed: Procedure(s):  COLON RESECTION LAPAROSCOPIC LEFT, CONVERTED TO OPEN    Consults:   Consults       No orders found from 7/24/2023 to 8/23/2023.          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  Final pathology is pending at this time.  Discharge she is tolerating regular diet and has had return of bowel function.  She is overall doing well.  She is going to follow-up with her primary care provider regarding her blood pressure medications and she will follow-up with us next Wednesday or sooner if she has any other concerns or questions.  She can resume all of her home medications and she was given 15 Norco 5 tablets to be used on appearing basis for pain.    Discharge Condition: Postoperatively Stable    Discharge Medications:     Your medication list        START taking these medications        Instructions Last Dose Given Next Dose Due   HYDROcodone-acetaminophen 5-325 MG per tablet  Commonly known as: NORCO      Take 1 tablet by mouth Every 4 (Four) Hours As Needed (Pain).              CONTINUE taking these medications        Instructions Last Dose Given Next Dose Due   atenolol 25 MG tablet  Commonly known as: TENORMIN      Take 0.5 tablets by mouth 2 (Two) Times a Day.       ferrous sulfate 325 (65 FE) MG tablet      Take 1 tablet by mouth Every Night.       omeprazole 20 MG capsule  Commonly known as: priLOSEC      Take 1 capsule by mouth Daily.       Oyster Shell Calcium/D 500-10 MG-MCG tablet tablet      Take 1 tablet by mouth Daily.        polyethyl glycol-propyl glycol 0.4-0.3 % solution ophthalmic solution (artificial tears)  Commonly known as: SYSTANE      Administer 1 drop to both eyes Daily As Needed.       vitamin C 250 MG tablet  Commonly known as: ASCORBIC ACID      Take 2 tablets by mouth Daily.       vitamin D 1.25 MG (25617 UT) capsule capsule  Commonly known as: ERGOCALCIFEROL      Take 1 capsule by mouth 1 (One) Time Per Week. TAKES EVERY WEDNESDAY              ASK your doctor about these medications        Instructions Last Dose Given Next Dose Due   VITAMIN B 12 PO      Take 1,000 mg by mouth Daily. SL                 Where to Get Your Medications        These medications were sent to [facility]  [address] [place] [address]      Hours: Monday through Friday 7:00am to 5:00pm Phone: [phone]   HYDROcodone-acetaminophen 5-325 MG per tablet           Activity as instructed by Dr. Bateman.  No lifting over 20 lbs until seen in the office.  Shower as instructed.      Regular Diet      Follow-up Appointments            This document has been electronically signed by Sarmad Bateman MD on August 25, 2023 10:36 CDT

## 2023-08-25 NOTE — THERAPY TREATMENT NOTE
Acute Care - Physical Therapy Treatment Note  [facility]     Patient Name: Tim Fraser  : 1945  MRN: [id number]  Today's Date: 2023      Visit Dx:     ICD-10-CM ICD-9-CM   1. Impaired mobility and ADLs  Z74.09 V49.89    Z78.9    2. Malignant neoplasm of transverse colon  C18.4 153.1   3. Impaired functional mobility, balance, gait, and endurance [Z74.09 (ICD-10-CM)]  Z74.09 V49.89     Patient Active Problem List   Diagnosis    Pseudophakia    Dry eye    Gastrointestinal bleeding    Malignant neoplasm of transverse colon    Colon cancer     Past Medical History:   Diagnosis Date    Artificial lens present     in position - both    Cancer     Cataract     Conjunctivitis     OTHER - RESOLVED    Keratoconjunctivitis sicca     Pain in eye     Presbyopia     Vision disturbance     OTHER, PHOTPSIA    Visual distortions of shape and size     Vitreous detachment of left eye      Past Surgical History:   Procedure Laterality Date    CATARACT EXTRACTION      Remove cataract, insert lens (Cataract extraction with intraocular lens implantation of the right eye)    COLONOSCOPY N/A 2023    Procedure: COLONOSCOPY 10:00;  Surgeon: Sigifredo Zapata DO;  Location: [facility] ENDOSCOPY;  Service: Gastroenterology;  Laterality: N/A;  tatoo at transverse mass    ENDOSCOPY N/A 2023    Procedure: ESOPHAGOGASTRODUODENOSCOPY 10:00;  Surgeon: Sigifredo Zapata DO;  Location: [facility] ENDOSCOPY;  Service: Gastroenterology;  Laterality: N/A;    EYE SURGERY  11/15/2010    AFTER CATARACT LASER SURGERY 01406 (YAG laser capsulotomy, left eye.)     PT Assessment (last 12 hours)       PT Evaluation and Treatment       Row Name 23 0840          Physical Therapy Time and Intention    Subjective Information no complaints  -JW     Document Type therapy note (daily note)  -JW     Mode of Treatment individual therapy;physical therapy  -       Row Name 23 0840          General Information    Patient Profile  Reviewed yes  -JW       Row Name 08/25/23 0840          Pain    Pretreatment Pain Rating 0/10 - no pain  -JW     Posttreatment Pain Rating 0/10 - no pain  -       Row Name 08/25/23 0840          Cognition    Orientation Status (Cognition) oriented x 4  -       Row Name 08/25/23 0840          Transfers    Transfers sit-stand transfer;stand-sit transfer  -       Row Name 08/25/23 0840          Sit-Stand Transfer    Sit-Stand [place] (Transfers) standby assist;independent  -JW       Row Name 08/25/23 0840          Stand-Sit Transfer    Stand-Sit [place] (Transfers) standby assist;independent  -JW       Row Name 08/25/23 0840          Gait/Stairs (Locomotion)    [place] Level (Gait) standby assist  -     Assistive Device (Gait) other (see comments)  no AD  -     Distance in Feet (Gait) 300' x 2  -JW     Comment, (Gait/Stairs) pt w/ 1 small LOB during gt when turning to look over her shoulder at her family  -       Row Name 08/25/23 0840          Motor Skills    Comments, Therapeutic Exercise issued HEP and performed:  B LE seated ther ex:  AP, LAQ, hip flexion, add pillow squeeze x 20 each  -       Row Name             Wound 08/22/23 0754 abdomen Incision    Wound - Properties Group Placement Date: 08/22/23  -TB Placement Time: 0754 -TB Location: abdomen  -TB Primary Wound Type: Incision  -TB    Retired Wound - Properties Group Placement Date: 08/22/23  -TB Placement Time: 0754  -TB Location: abdomen  -TB Primary Wound Type: Incision  -TB    Retired Wound - Properties Group Date first assessed: 08/22/23  -TB Time first assessed: 0754  -TB Location: abdomen  -TB Primary Wound Type: Incision  -TB      Row Name             NPWT (Negative Pressure Wound Therapy) 08/22/23 1000 abdomen    NPWT (Negative Pressure Wound Therapy) - Properties Group Placement Date: 08/22/23  -ST Placement Time: 1000  -ST Location: abdomen  -ST Additional Comments: pravena from surgery  -ST    Retired NPWT (Negative  Pressure Wound Therapy) - Properties Group Placement Date: 08/22/23  -ST Placement Time: 1000  -ST Location: abdomen  -ST Additional Comments: roseann from surgery  -ST    Retired NPWT (Negative Pressure Wound Therapy) - Properties Group Placement Date: 08/22/23 -ST Placement Time: 1000  -ST Location: abdomen  -ST Additional Comments: roseann from surgery  -ST      Row Name 08/25/23 0840          Plan of Care Review    Plan of Care Reviewed With patient;daughter  -     Outcome Evaluation pt t/fers sit<>stand SBA/Ind, ambulates ~300' x 2 w/ no AD w/ CGA/SBA, performs B LE seated ther ex x20 reps, issued and reviewed home safety and HEP  -       Row Name 08/25/23 0840          Vital Signs    Pretreatment Heart Rate (beats/min) 77  -     Pre SpO2 (%) 99  -     O2 Delivery Pre Treatment room air  -     Pre Patient Position Sitting  -     Intra Patient Position Standing  -     Post Patient Position Sitting  -       Row Name 08/25/23 0840          Positioning and Restraints    Pre-Treatment Position sitting in chair/recliner  -     Post Treatment Position chair  -JW     In Chair sitting;with family/caregiver  -       Row Name 08/25/23 0840          Bed Mobility Goal 1 (PT)    Activity/Assistive Device (Bed Mobility Goal 1, PT) sit to supine/supine to sit  -     [place] Level/Cues Needed (Bed Mobility Goal 1, PT) independent  -     Time Frame (Bed Mobility Goal 1, PT) by discharge  -     Strategies/Barriers (Bed Mobility Goal 1, PT) HOB flat, no bed rails.  -     Progress/Outcomes (Bed Mobility Goal 1, PT) goal met  -       Row Name 08/25/23 0840          Transfer Goal 1 (PT)    Activity/Assistive Device (Transfer Goal 1, PT) sit-to-stand/stand-to-sit;bed-to-chair/chair-to-bed;walker, rolling  -     [place] Level/Cues Needed (Transfer Goal 1, PT) independent;modified independence  -     Time Frame (Transfer Goal 1, PT) by discharge  -     Strategies/Barriers (Transfers Goal 1,  PT) Left side abdominal incision. May progress to no AD if appropriate.  -JW     Progress/Outcome (Transfer Goal 1, PT) goal not met  -JW       Row Name 08/25/23 0840          Gait Training Goal 1 (PT)    Activity/Assistive Device (Gait Training Goal 1, PT) increase endurance/gait distance;walker, rolling  -JW     [place] Level (Gait Training Goal 1, PT) independent;modified independence  -JW     Distance (Gait Training Goal 1, PT) 300' X 1  -JW     Time Frame (Gait Training Goal 1, PT) by discharge  -JW     Strategies/Barriers (Gait Training Goal 1, PT) Left side abdominal incision. May progress to no AD if appropriate.  -JW     Progress/Outcome (Gait Training Goal 1, PT) goal partially met  -JW       Row Name 08/25/23 0840          Stairs Goal 1 (PT)    Activity/Assistive Device (Stairs Goal 1, PT) ascending stairs;descending stairs;using handrail, left;using handrail, right  -JW     [place] Level/Cues Needed (Stairs Goal 1, PT) modified independence  -JW     Number of Stairs (Stairs Goal 1, PT) 4  -JW     Time Frame (Stairs Goal 1, PT) by discharge  -JW     Strategies/Barriers (Stairs Goal 1, PT) Left side abdominal incision. May progress to no AD if appropriate.  -JW     Progress/Outcome (Stairs Goal 1, PT) goal not met  -               User Key  (r) = Recorded By, (t) = Taken By, (c) = Cosigned By      Initials Name Provider Type     Bisi Miller, MARIA ESTHER Physical Therapist Assistant    Nena Griffin RN Registered Nurse    Cherelle Phelan RN Registered Nurse                    Physical Therapy Education       Title: PT OT SLP Therapies (In Progress)       Topic: Physical Therapy (In Progress)       Point: Mobility training (In Progress)       Learning Progress Summary             Patient Acceptance, E, NR by MM at 8/23/2023 1236    Comment: PT POC and goals, proper hand placement to facilitate transfers, use of FWW to increase safety with ambulation.   Family Acceptance, E, NR  by  at 8/23/2023 4714    Comment: PT POC and goals, proper hand placement to facilitate transfers, use of FWW to increase safety with ambulation.                         Point: Home exercise program (Not Started)       Learner Progress:  Not documented in this visit.              Point: Body mechanics (Not Started)       Learner Progress:  Not documented in this visit.              Point: Precautions (Not Started)       Learner Progress:  Not documented in this visit.                              User Key       Initials Effective Dates Name Provider Type Discipline     06/26/23 -  Ama Jimenez, PT Physical Therapist PT                  PT Recommendation and Plan     Plan of Care Reviewed With: patient, daughter  Outcome Evaluation: pt t/fers sit<>stand SBA/Ind, ambulates ~300' x 2 w/ no AD w/ CGA/SBA, performs B LE seated ther ex x20 reps, issued and reviewed home safety and HEP   Outcome Measures       Row Name 08/25/23 1100             How much help from another person do you currently need...    Turning from your back to your side while in flat bed without using bedrails? 4  -JW      Moving from lying on back to sitting on the side of a flat bed without bedrails? 4  -JW      Moving to and from a bed to a chair (including a wheelchair)? 3  -JW      Standing up from a chair using your arms (e.g., wheelchair, bedside chair)? 4  -JW      Climbing 3-5 steps with a railing? 3  -JW      To walk in hospital room? 3  -JW      AM-PAC 6 Clicks Score (PT) 21  -         Functional Assessment    Outcome Measure Options AM-PAC 6 Clicks Basic Mobility (PT)  -                User Key  (r) = Recorded By, (t) = Taken By, (c) = Cosigned By      Initials Name Provider Type    JW Bisi Miller, PTA Physical Therapist Assistant                     Time Calculation:    PT Charges       Row Name 08/25/23 0840             Time Calculation    Start Time 0840  -      Stop Time 0905  -      Time Calculation (min)  25 min  -FRIEDA      PT Received On 08/25/23  -FRIEDA         Time Calculation- PT    Total Timed Code Minutes- PT 25 minute(s)  -FRIEDA                User Key  (r) = Recorded By, (t) = Taken By, (c) = Cosigned By      Initials Name Provider Type    Bisi Anaya, PTA Physical Therapist Assistant                      PT G-Codes  Outcome Measure Options: AM-PAC 6 Clicks Basic Mobility (PT)  AM-PAC 6 Clicks Score (PT): 21  AM-PAC 6 Clicks Score (OT): 24    Bisi Miller PTA  8/25/2023

## 2023-08-25 NOTE — PLAN OF CARE
Problem: Adult Inpatient Plan of Care  Goal: Plan of Care Review  Recent Flowsheet Documentation  Taken 8/25/2023 1516 by Dorothy Conrad COTA  Progress: improving  Plan of Care Reviewed With: patient  Outcome Evaluation: Patient supine in bed. Supine to sit Sit to stand Independent. UB and LB dressing with [place]. Educated patient on Home safety. All needs in reach. Family in room.   Goal Outcome Evaluation:  Plan of Care Reviewed With: patient        Progress: improving  Outcome Evaluation: Patient supine in bed. Supine to sit Sit to stand Independent. UB and LB dressing with [place]. Educated patient on Home safety. All needs in reach. Family in room.

## 2023-08-27 LAB
QT INTERVAL: 418 MS
QTC INTERVAL: 420 MS

## 2023-08-28 NOTE — PAYOR COMM NOTE
"  Carina Peñamore  [facility]  Case Management Extender  [phone] phone  [phone] fax    Auth# [id number]     Rosemary Perez (78 y.o. Female)       Date of Birth   1945    Social Security Number       Address   [address] [place], [place] [address]    Home Phone   [phone]    MRN   [id number]       Zoroastrianism   Zoroastrianism    Marital Status                               Admission Date   8/22/23    Admission Type   Elective    Admitting Provider   Sarmad Bateman MD    Attending Provider       Department, Room/Bed   [facility] [place], 430/1       Discharge Date   8/25/2023    Discharge Disposition   Home or Self Care    Discharge Destination                                 Attending Provider: (none)   Allergies: Codeine, Doxycycline, Latex, Lisinopril    Isolation: None   Infection: None   Code Status: Prior    Ht: 160 cm (63\")   Wt: 60.5 kg (133 lb 6.4 oz)    Admission Cmt: None   Principal Problem: 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]                   Active Insurance as of 8/22/2023       Primary Coverage       Payor Plan Insurance Group Employer/Plan Group    HUMANA MEDICARE REPLACEMENT HUMANA MEDICARE REPLACEMENT [id number]       Payor Plan Address Payor Plan Phone Number Payor Plan Fax Number Effective Dates    [address] [address] [phone]  1/1/2023 - None Entered    [facility] [id number]         Subscriber Name Subscriber Birth Date Member ID       ROSEMARY PEREZ 1945 [id number]                     Emergency Contacts        (Rel.) Home Phone Work Phone Mobile Phone    Bhumika Perez (Daughter) [phone] -- [phone]    NICOLAS PEREZ (Daughter) [phone] -- [phone]                 Discharge Summary        Sarmad Bateman MD at 08/25/23 1035          Discharge Summary    Date of Admission: 8/22/2023  Date of Discharge:  8/25/2023  Service: General Surgery  Attending: Sarmad" Bhavana    Procedures Performed: Procedure(s):  COLON RESECTION LAPAROSCOPIC LEFT, CONVERTED TO OPEN    Consults:   Consults       No orders found from 7/24/2023 to 8/23/2023.          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  Final pathology is pending at this time.  Discharge she is tolerating regular diet and has had return of bowel function.  She is overall doing well.  She is going to follow-up with her primary care provider regarding her blood pressure medications and she will follow-up with us next Wednesday or sooner if she has any other concerns or questions.  She can resume all of her home medications and she was given 15 Norco 5 tablets to be used on appearing basis for pain.    Discharge Condition: Postoperatively Stable    Discharge Medications:     Your medication list        START taking these medications        Instructions Last Dose Given Next Dose Due   HYDROcodone-acetaminophen 5-325 MG per tablet  Commonly known as: NORCO      Take 1 tablet by mouth Every 4 (Four) Hours As Needed (Pain).              CONTINUE taking these medications        Instructions Last Dose Given Next Dose Due   atenolol 25 MG tablet  Commonly known as: TENORMIN      Take 0.5 tablets by mouth 2 (Two) Times a Day.       ferrous sulfate 325 (65 FE) MG tablet      Take 1 tablet by mouth Every Night.       omeprazole 20 MG capsule  Commonly known as: priLOSEC      Take 1 capsule by mouth Daily.       Oyster Shell Calcium/D 500-10 MG-MCG tablet tablet      Take 1 tablet by mouth Daily.       polyethyl glycol-propyl glycol 0.4-0.3 % solution ophthalmic solution (artificial tears)  Commonly known as: SYSTANE      Administer 1  drop to both eyes Daily As Needed.       vitamin C 250 MG tablet  Commonly known as: ASCORBIC ACID      Take 2 tablets by mouth Daily.       vitamin D 1.25 MG (70709 UT) capsule capsule  Commonly known as: ERGOCALCIFEROL      Take 1 capsule by mouth 1 (One) Time Per Week. TAKES EVERY WEDNESDAY              ASK your doctor about these medications        Instructions Last Dose Given Next Dose Due   VITAMIN B 12 PO      Take 1,000 mg by mouth Daily. SL                 Where to Get Your Medications        These medications were sent to [facility]  [address] [place] [address]      Hours: Monday through Friday 7:00am to 5:00pm Phone: [phone]   HYDROcodone-acetaminophen 5-325 MG per tablet           Activity as instructed by Dr. Bateman.  No lifting over 20 lbs until seen in the office.  Shower as instructed.      Regular Diet      Follow-up Appointments            This document has been electronically signed by Sarmad Bateman MD on August 25, 2023 10:36 CDT              Electronically signed by Sarmad Bateman MD at 08/25/23 1037       Discharge Order (From admission, onward)       Start     Ordered    08/25/23 1030  Discharge patient  Once        Expected Discharge Date: 08/25/23   Discharge Disposition: Home or Self Care   Physician of Record for Attribution - Please select from Treatment Team: SARMAD BATEMAN [[id number]]   Review needed by CMO to determine Physician of Record: No      Question Answer Comment   Physician of Record for Attribution - Please select from Treatment Team SARMAD BATEMAN    Review needed by CMO to determine Physician of Record No        08/25/23 2202

## 2023-08-29 ENCOUNTER — READMISSION MANAGEMENT (OUTPATIENT)
Dept: CALL CENTER | Facility: HOSPITAL | Age: 78
End: 2023-08-29
Payer: MEDICARE

## 2023-08-29 LAB — REF LAB TEST METHOD: NORMAL

## 2023-08-29 NOTE — OUTREACH NOTE
General Surgery Week 1 Survey      Flowsheet Row Responses   [facility] patient discharged from? [place]   Does the patient have one of the following disease processes/diagnoses(primary or secondary)? General Surgery   Week 1 attempt successful? No   Unsuccessful attempts Attempt 1            Hui COSME - Licensed Nurse

## 2023-08-30 ENCOUNTER — OFFICE VISIT (OUTPATIENT)
Dept: SURGERY | Facility: CLINIC | Age: 78
End: 2023-08-30
Payer: MEDICARE

## 2023-08-30 VITALS
BODY MASS INDEX: 22.68 KG/M2 | DIASTOLIC BLOOD PRESSURE: 74 MMHG | WEIGHT: 128 LBS | SYSTOLIC BLOOD PRESSURE: 134 MMHG | HEIGHT: 63 IN | HEART RATE: 74 BPM | TEMPERATURE: 96.8 F

## 2023-08-30 DIAGNOSIS — C18.4 MALIGNANT NEOPLASM OF TRANSVERSE COLON: Primary | ICD-10-CM

## 2023-08-30 PROCEDURE — 1159F MED LIST DOCD IN RCRD: CPT | Performed by: SURGERY

## 2023-08-30 PROCEDURE — 1160F RVW MEDS BY RX/DR IN RCRD: CPT | Performed by: SURGERY

## 2023-08-30 PROCEDURE — 99024 POSTOP FOLLOW-UP VISIT: CPT | Performed by: SURGERY

## 2023-08-30 RX ORDER — ATENOLOL 50 MG/1
50 TABLET ORAL DAILY
COMMUNITY

## 2023-08-30 NOTE — PROGRESS NOTES
78-year-old female who is now 8 days status post her left segmental colectomy for a T3N1B colon cancer.  All margins were negative.  She had 2 of 14 lymph nodes which were positive.  She is present with her 2 daughter-in-law's who watch over her very well.  She is doing well.  She is having normal bowel function and is tolerating regular diet.  She feels better than she is felt in a while.  Has not required any pain medication.  I went over pathology with her and her daughter-in-law's and answered all their questions.  Her incisions intact healing nicely remove the staples and place Steri-Strips.  We will refer patient to medical oncology for consideration for chemotherapy.  Patient will follow-up with us in 2 weeks or sooner if she has any other concerns or questions

## 2023-08-31 ENCOUNTER — READMISSION MANAGEMENT (OUTPATIENT)
Dept: CALL CENTER | Facility: HOSPITAL | Age: 78
End: 2023-08-31
Payer: MEDICARE

## 2023-08-31 NOTE — OUTREACH NOTE
General Surgery Week 1 Survey      Flowsheet Row Responses   [facility] facility patient discharged from? [place]   Does the patient have one of the following disease processes/diagnoses(primary or secondary)? General Surgery   Week 1 attempt successful? No   Unsuccessful attempts Attempt 2            Ann Marie MILLER - Registered Nurse

## 2023-09-01 ENCOUNTER — CONSULT (OUTPATIENT)
Dept: ONCOLOGY | Facility: CLINIC | Age: 78
End: 2023-09-01
Payer: MEDICARE

## 2023-09-01 VITALS
HEART RATE: 63 BPM | WEIGHT: 128.4 LBS | OXYGEN SATURATION: 100 % | SYSTOLIC BLOOD PRESSURE: 169 MMHG | HEIGHT: 63 IN | DIASTOLIC BLOOD PRESSURE: 71 MMHG | RESPIRATION RATE: 20 BRPM | BODY MASS INDEX: 22.75 KG/M2 | TEMPERATURE: 98.3 F

## 2023-09-01 DIAGNOSIS — C18.4 MALIGNANT NEOPLASM OF TRANSVERSE COLON: Primary | ICD-10-CM

## 2023-09-01 DIAGNOSIS — I10 PRIMARY HYPERTENSION: ICD-10-CM

## 2023-09-01 PROBLEM — C18.9 COLON CANCER: Status: RESOLVED | Noted: 2023-08-22 | Resolved: 2023-09-01

## 2023-09-01 RX ORDER — CAPECITABINE 500 MG/1
1500 TABLET, FILM COATED ORAL 2 TIMES DAILY
Qty: 84 TABLET | Refills: 0 | Status: SHIPPED | OUTPATIENT
Start: 2023-09-01 | End: 2023-09-15

## 2023-09-01 RX ORDER — ONDANSETRON HYDROCHLORIDE 8 MG/1
8 TABLET, FILM COATED ORAL EVERY 8 HOURS PRN
Qty: 90 TABLET | Refills: 0 | Status: SHIPPED | OUTPATIENT
Start: 2023-09-01

## 2023-09-03 PROBLEM — I10 PRIMARY HYPERTENSION: Status: ACTIVE | Noted: 2023-09-03

## 2023-09-04 NOTE — PROGRESS NOTES
REASON FOR CONSULTATION:  Colon cancer   Provide an opinion on any further workup or treatment                             REQUESTING PHYSICIAN:  Sarmad Bateman MD     RECORDS OBTAINED:  Records of the patients history including those obtained from the referring provider were reviewed and summarized in detail.        History of Present Illness     Patient is a pleasant 78-year-old female who was seen in consultation at the request of Dr. Bateman for evaluation of colon cancer.  Patient had iron deficiency anemia evaluated by Dr. Zapata.  She underwent upper and lower GI endoscopy on August 8 which showed transverse colon adenocarcinoma.  She was subsequently evaluated by Dr. Bateman and underwent colon resection on August 22, 2023.  This showed invasive moderately differentiated adenocarcinoma.  T3N1B.  MSI stable.  Patient has recovered well from surgery.  She has been referred to me for further evaluation and management of her colon cancer.      Past Medical History:   Diagnosis Date    Artificial lens present     in position - both    Cancer     Cataract     Conjunctivitis     OTHER - RESOLVED    Hypertension     Keratoconjunctivitis sicca     Pain in eye     Presbyopia     Vision disturbance     OTHER, PHOTPSIA    Visual distortions of shape and size     Vitreous detachment of left eye         Past Surgical History:   Procedure Laterality Date    CATARACT EXTRACTION      Remove cataract, insert lens (Cataract extraction with intraocular lens implantation of the right eye)    COLON RESECTION Left 8/22/2023    Procedure: COLON RESECTION LAPAROSCOPIC LEFT, CONVERTED TO OPEN AT 0806;  Surgeon: Sarmad Bateman MD;  Location: [facility] OR;  Service: General;  Laterality: Left;    COLONOSCOPY N/A 8/8/2023    Procedure: COLONOSCOPY 10:00;  Surgeon: Sigifredo Zapata DO;  Location: [facility] ENDOSCOPY;  Service: Gastroenterology;  Laterality: N/A;  tatoo at transverse mass    ENDOSCOPY N/A 8/8/2023    Procedure:  "ESOPHAGOGASTRODUODENOSCOPY 10:00;  Surgeon: Sigifredo Zapata DO;  Location: [facility] ENDOSCOPY;  Service: Gastroenterology;  Laterality: N/A;    EYE SURGERY  11/15/2010    AFTER CATARACT LASER SURGERY 77200 (YAG laser capsulotomy, left eye.)        Current Outpatient Medications on File Prior to Visit   Medication Sig Dispense Refill    atenolol (TENORMIN) 50 MG tablet Take 1 tablet by mouth Daily. 2 tablet daily. One in morning and one in afternoon      Calcium Carb-Cholecalciferol (Oyster Shell Calcium/D) 500-10 MG-MCG tablet tablet Take 1 tablet by mouth Daily.      ferrous sulfate 325 (65 FE) MG tablet Take 1 tablet by mouth Every Night.      omeprazole (priLOSEC) 20 MG capsule Take 1 capsule by mouth Daily.      polyethyl glycol-propyl glycol (SYSTANE) 0.4-0.3 % solution ophthalmic solution (artificial tears) Administer 1 drop to both eyes Daily As Needed.      vitamin C (ASCORBIC ACID) 250 MG tablet Take 2 tablets by mouth Daily.      vitamin D (ERGOCALCIFEROL) 1.25 MG (80711 UT) capsule capsule Take 1 capsule by mouth 1 (One) Time Per Week. TAKES EVERY WEDNESDAY      HYDROcodone-acetaminophen (NORCO) 5-325 MG per tablet Take 1 tablet by mouth Every 4 (Four) Hours As Needed for pain. (Patient not taking: Reported on 9/1/2023) 15 tablet 0     No current facility-administered medications on file prior to visit.        ALLERGIES:    Allergies   Allergen Reactions    Codeine Other (See Comments)     \"Causes chest pressure\"    Doxycycline Rash    Latex Rash    Lisinopril Cough        Social History     Socioeconomic History    Marital status:    Tobacco Use    Smoking status: Never    Smokeless tobacco: Never   Vaping Use    Vaping Use: Never used   Substance and Sexual Activity    Alcohol use: No    Drug use: Never    Sexual activity: Not Currently        Family History   Problem Relation Age of Onset    Heart failure Mother     Colon cancer Mother     Bone cancer Father     Aneurysm Sister     Aortic " "aneurysm Brother     Coronary artery disease Other     Heart disease Other     Stroke Other             Objective     Vitals:    09/01/23 1406   BP: 169/71   Pulse: 63   Resp: 20   Temp: 98.3 øF (36.8 øC)   SpO2: 100%   Weight: 58.2 kg (128 lb 6.4 oz)   Height: 160 cm (63\")   PainSc: 0-No pain          No data to display                Physical Exam  Vitals and nursing note reviewed.   Constitutional:       Appearance: Normal appearance.   Neurological:      General: No focal deficit present.      Mental Status: She is alert and oriented to person, place, and time. Mental status is at baseline.   Psychiatric:         Mood and Affect: Mood normal.         Behavior: Behavior normal.         Thought Content: Thought content normal.         RECENT LABS:Independently reviewed and summarized  Hematology WBC   Date Value Ref Range Status   08/23/2023 10.95 (H) 3.40 - 10.80 10*3/mm3 Final   08/14/2019 4.6 4.0 - 11.0 10*3/uL Final     RBC   Date Value Ref Range Status   08/23/2023 3.63 (L) 3.77 - 5.28 10*6/mm3 Final   08/14/2019 4.72 4.15 - 4.87 10*6/uL Final     Hemoglobin   Date Value Ref Range Status   08/23/2023 9.9 (L) 12.0 - 15.9 g/dL Final   08/14/2019 13.6 12.7 - 14.7 g/dL Final     Hematocrit   Date Value Ref Range Status   08/23/2023 30.7 (L) 34.0 - 46.6 % Final   08/14/2019 42.3 37.9 - 43.9 % Final     Platelets   Date Value Ref Range Status   08/23/2023 281 140 - 450 10*3/mm3 Final   08/14/2019 236 150 - 450 10*3/uL Final            Lab Results   Component Value Date    GLUCOSE 128 (H) 08/23/2023    BUN 6 (L) 08/23/2023    CREATININE 0.88 08/23/2023    EGFR 67.4 08/23/2023    BCR 6.8 (L) 08/23/2023    K 4.2 08/23/2023    CO2 21.0 (L) 08/23/2023    CALCIUM 7.9 (L) 08/23/2023    ALBUMIN 3.5 08/21/2023    BILITOT 0.3 08/21/2023    AST 12 08/21/2023    ALT 9 08/21/2023     CEA: 5.16       Imaging (independently reviewed and summarized):     XR Chest 2 View    Result Date: 8/10/2023  No acute cardiopulmonary process. " [facility]-[id number]    CT Abdomen Pelvis With Contrast    Result Date: 8/10/2023  1.  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 2.  The liver is normal and there is no evidence of peritoneal spread or central adenopathy. 3.  Gallstones. [id number]    XR Abdomen KUB    Result Date: 8/8/2023  Single view of the abdomen demonstrates gaseous distention of multiple bowel loops. There is a scope projected over the left hemiabdomen. Portions of the right abdomen are not included.         Pathology (result reviewed):     8/8/23    DIAGNOSIS:  A.     DUODENUM, BIOPSY:  Duodenal type mucosa with no significant histopathologic abnormalities  Negative for Celiac disease, metaplasia, microorganisms or atypia  B.     GASTRIC ANTRUM, BIOPSY:  Gastric mucosa with mild chronic gastritis  Negative for H. pylori, metaplasia , dysplasia or malignancy  C.     STOMACH, POLYP:  Fundic gland polyp  Negative for H. pylori, metaplasia , dysplasia or malignancy  D.     TRANSVERSE COLON BIOPSY, MASS:  Moderately differentiated colonic adenocarcinoma  MSI status: Stable/intact     8/22/23    DIAGNOSIS:  COLON, RESECTION, SPLENIC FLEXURE, DISTAL TRANSVERSE, &  PROXIMAL DESCENDING WITH LYMPH NODES:  Invasive moderately differentiated adenocarcinoma, pT3, pN1b, see CAP  checklist.  Margins negative for carcinoma.  Two out of fourteen lymph nodes positive for metastatic carcinoma (2/14).  One tumor deposit identified.       Tim Fraser reports a pain score of 0.  Given her pain assessment as noted, treatment options were discussed and the following options were decided upon as a follow-up plan to address the patient's pain: continuation of current treatment plan for pain.    Patient screened negative for depression based on a PHQ-9 score of 0 on 9/1/2023.     Advance Care Planning   ACP discussion was declined by the patient. Patient does not  have an advance directive, declines further assistance.         Diagnosis:   (1) Adenocarcinoma of colon, transverse colon   Stage IIIB (pT3, pN1b, cM0)   MSI stable.     Date of diagnosis: 8/8/23    Prior therapy:   Colon resection (8/22/23)       (2) Hypertension     All are new diagnosis/problems for me.     Assessment & Plan     (1) Adenocarcinoma of colon, transverse colon     New diagnosis for me.   Extensive old medical records reviewed.   Dr Zapata notes, endoscopy results, Dr Bateman notes, operative reports, pathology report, imaging reviewed.   Patient with diagnosis of stage IIIB colon cancer.     Discussed diagnosis, prognosis and treatment options at length.     She is rather healthy 78 year old female who minima co-morbidities.   She has hypertension which is under well controlled.   No CAD.   Mild neuropathy in LE.     For patients who have undergone potentially curative resection, disease recurrence is thought to arise from clinically occult micrometastases that are present at the time of surgery. The goal of postoperative (adjuvant) therapy is to eradicate these micrometastases, thereby increasing the cure rate.    fluoropyrimidine-based adjuvant chemotherapy provides an approximately 30 percent reduction in the risk of disease recurrence and a 22 to 32 percent reduction in mortality    Given she is 78 year old with mild neuropathy and small benefit of oxaliplatin in her case I recommend not adding oxaliplatin.     Side effects associated with xeloda - fatigue, nausea, emesis, constipation/diarrhea, mucositis, cytopenias, increase risk of infection, hand foot skin reaction, potential risk of cardiotoxicity discussed.     She understood and was agreeable to start xeloda.   New prescription sent.       (2) Hypertension     BP is well controlled on atenolol.   Patient needs to be monitored closely on chemotherapy which could cause worsening blood pressure control.  Continue current antihypertensive  medications.  Closely monitor blood pressure in clinic  and at home.  Report to us if any high or low blood pressure.  Counseled and educated.

## 2023-09-05 ENCOUNTER — READMISSION MANAGEMENT (OUTPATIENT)
Dept: CALL CENTER | Facility: HOSPITAL | Age: 78
End: 2023-09-05
Payer: MEDICARE

## 2023-09-05 ENCOUNTER — SPECIALTY PHARMACY (OUTPATIENT)
Dept: ONCOLOGY | Facility: CLINIC | Age: 78
End: 2023-09-05
Payer: MEDICARE

## 2023-09-05 NOTE — OUTREACH NOTE
General Surgery Week 1 Survey      Flowsheet Row Responses   [facility] patient discharged from? [facility]   Does the patient have one of the following disease processes/diagnoses(primary or secondary)? General Surgery   Week 1 attempt successful? No   Unsuccessful attempts Attempt 3   General alerts for this patient COLON RESECTION LAPAROSCOPIC LEFT, CONVERTED TO OPEN   Discharge diagnosis Malignant neoplasm of transverse colon            MAMIE TERAN - Registered Nurse

## 2023-09-06 ENCOUNTER — OFFICE VISIT (OUTPATIENT)
Dept: ONCOLOGY | Facility: CLINIC | Age: 78
End: 2023-09-06
Payer: MEDICARE

## 2023-09-06 ENCOUNTER — SPECIALTY PHARMACY (OUTPATIENT)
Dept: ONCOLOGY | Facility: CLINIC | Age: 78
End: 2023-09-06
Payer: MEDICARE

## 2023-09-06 VITALS
OXYGEN SATURATION: 98 % | SYSTOLIC BLOOD PRESSURE: 186 MMHG | BODY MASS INDEX: 22.85 KG/M2 | WEIGHT: 129 LBS | HEART RATE: 66 BPM | RESPIRATION RATE: 18 BRPM | DIASTOLIC BLOOD PRESSURE: 79 MMHG

## 2023-09-06 DIAGNOSIS — C18.4 MALIGNANT NEOPLASM OF TRANSVERSE COLON: ICD-10-CM

## 2023-09-06 DIAGNOSIS — C18.4 MALIGNANT NEOPLASM OF TRANSVERSE COLON: Primary | ICD-10-CM

## 2023-09-06 RX ORDER — CAPECITABINE 500 MG/1
1500 TABLET, FILM COATED ORAL 2 TIMES DAILY
Qty: 84 TABLET | Refills: 0 | Status: SHIPPED | OUTPATIENT
Start: 2023-09-06 | End: 2023-09-20

## 2023-09-06 NOTE — PROGRESS NOTES
Completed a PA for the Xeloda. [facility] said that this medication would not be covered under Medicare Part D but would be covered under Part B. We cannot bill oral specialty medications under Part B at our facility. Will [facility] to send to [facility] to get assistance with billing for this medication. Tried to call patient to update her but was unable to reach. Patient has appointment today at 1:45 for chemo education. Will try to reach patient then.

## 2023-09-07 ENCOUNTER — SPECIALTY PHARMACY (OUTPATIENT)
Dept: ONCOLOGY | Facility: CLINIC | Age: 78
End: 2023-09-07
Payer: MEDICARE

## 2023-09-07 NOTE — PROGRESS NOTES
Care Coordination General Call Note    She will be receiving her xeloda from [facility] pharmacy [phone]. Pt will receive her medication on 09/08. Pt is aware and understanding.         Jodie Jara, Pharmacy Technician  9/7/2023  10:34 CDT

## 2023-09-07 NOTE — PROGRESS NOTES
CHEMOTHERAPY PREPARATION    Tim Fraser  [id number]  1945    Chief Complaint: chemo education     History of present illness:  Tim Fraser is a 78 y.o. year old female who is here today for chemotherapy preparation and needs assessment. The patient has been diagnosed with colon cancer and is scheduled to begin treatment with Capecitabine.     Oncology History:    Oncology/Hematology History   Malignant neoplasm of transverse colon   8/18/2023 Initial Diagnosis    Malignant neoplasm of transverse colon     9/1/2023 Cancer Staged    Staging form: Colon And Rectum, AJCC 8th Edition  - Pathologic stage from 9/1/2023: Stage IIIB (pT3, pN1b, cM0) - Signed by Zully Piña MD on 9/1/2023 9/1/2023 -  Chemotherapy    OP colon Capecitabine           Past Medical History:   Diagnosis Date    Artificial lens present     in position - both    Cancer     Cataract     Conjunctivitis     OTHER - RESOLVED    Hypertension     Keratoconjunctivitis sicca     Pain in eye     Presbyopia     Vision disturbance     OTHER, PHOTPSIA    Visual distortions of shape and size     Vitreous detachment of left eye        Past Surgical History:   Procedure Laterality Date    CATARACT EXTRACTION      Remove cataract, insert lens (Cataract extraction with intraocular lens implantation of the right eye)    COLON RESECTION Left 8/22/2023    Procedure: COLON RESECTION LAPAROSCOPIC LEFT, CONVERTED TO OPEN AT 0806;  Surgeon: Sarmad Bateman MD;  Location: [facility] OR;  Service: General;  Laterality: Left;    COLONOSCOPY N/A 8/8/2023    Procedure: COLONOSCOPY 10:00;  Surgeon: Sigifredo Zapata DO;  Location: [facility] ENDOSCOPY;  Service: Gastroenterology;  Laterality: N/A;  tatoo at transverse mass    ENDOSCOPY N/A 8/8/2023    Procedure: ESOPHAGOGASTRODUODENOSCOPY 10:00;  Surgeon: Sigifredo Zapata DO;  Location: [facility] ENDOSCOPY;  Service: Gastroenterology;  Laterality: N/A;    EYE SURGERY  11/15/2010    AFTER CATARACT  LASER SURGERY 15568 (YAG laser capsulotomy, left eye.)       MEDICATIONS: The current medication list was reviewed and reconciled.     Allergies:  is allergic to codeine, doxycycline, latex, and lisinopril.    Family History   Problem Relation Age of Onset    Heart failure Mother     Colon cancer Mother     Bone cancer Father     Aneurysm Sister     Aortic aneurysm Brother     Coronary artery disease Other     Heart disease Other     Stroke Other          Review of Systems    Physical Exam  Vital Signs: BP (!) 186/79   Pulse 66   Resp 18   Wt 58.5 kg (129 lb)   SpO2 98%   BMI 22.85 kg/m²    General Appearance:  alert, cooperative, no apparent distress, appears stated age, and normal weight   Neurologic/Psychiatric: A&O x 3, gait steady, appropriate affect   HEENT:  Normocephalic, without obvious abnormality, mucous membranes moist   Lungs:   Clear to auscultation bilaterally; respirations regular, even, and unlabored bilaterally   Heart:  Regular rate and rhythm, no murmurs appreciated   Extremities: Normal, atraumatic; no clubbing, cyanosis, or edema    Skin: No rashes, lesions, or abnormal coloration noted     ECOG Performance Status: (0) Fully Active - Able to Carry On All Pre-disease Performance Without Restriction          NEEDS ASSESSMENTS    Genetics  The patient's new diagnosis and family history have been reviewed for genetic counseling needs. A genetic referral is not recommended.     Psychosocial  The patient has completed a PHQ-2 Depression Screening  today.   PHQ-2 results show 0 (No Depression).   Copies of patient's questionnaires will be scanned into EMR for details and further reference.      Advanced Care Planning  The patient does not have an up-to-date advanced directive.     Additional Referral needs  none      CHEMOTHERAPY EDUCATION    Booklets Given: Chemo cares education sheet on Xeloda and cancer center chemotherapy binder.  Consent form signed today.    Chemotherapy/Biotherapy  Education Sheets: (list all that apply)  nausea management, acid reflux management, diarrhea management, Cancer resourse contacts information, and skin and mouth care                                                                                                                                                                 Chemotherapy Regimen:   Treatment Plans       Name Type Plan Dates Plan Provider         Active    OP colon Capecitabine ONCOLOGY TREATMENT  8/31/2023 - Present Zully Piña MD                      TOPICS EDUCATION PROVIDED COMMENTS   ANEMIA:  role of RBC, cause, s/s, ways to manage, role of transfusion [x]    THROMBOCYTOPENIA:  role of platelet, cause, s/s, ways to prevent bleeding, things to avoid, when to seek help [x]    NEUTROPENIA:  role of WBC, cause, infection precautions, s/s of infection, when to call MD [x]    NUTRITION & APPETITE CHANGES:  importance of maintaining healthy diet & weight, ways to manage to improve intake, dietary consult, exercise regimen [x]    DIARRHEA:  causes, s/s of dehydration, ways to manage, dietary changes, when to call MD [x]    CONSTIPATION:  causes, ways to manage, dietary changes, when to call MD [x]    NAUSEA & VOMITING:  cause, use of antiemetics, dietary changes, when to call MD [x]    MOUTH SORES:  causes, oral care, ways to manage [x]    ALOPECIA:  cause, ways to manage, resources [x]    INFERTILITY & SEXUALITY:  causes, fertility preservation options, sexuality changes, ways to manage, importance of birth control [x]    NERVOUS SYSTEM CHANGES:  causes, s/s, neuropathies, cognitive changes, ways to manage [x]    PAIN:  causes, ways to manage [x]    SKIN & NAIL CHANGES:  cause, s/s, ways to manage [x]    ORGAN TOXICITIES:  cause, s/s, need for diagnostic tests, labs, when to notify MD [x]    SURVIVORSHIP:  distress, distress assessment, secondary malignancies, early/late effects, follow-up, social issues, social support [x]    HOME CARE:   use of spill kits, storing of PO chemo, how to manage bodily fluids [x]    MISCELLANEOUS:  drug interactions, administration, vesicant, et [x]        Assessment and Plan:    Diagnoses and all orders for this visit:    1. Malignant neoplasm of transverse colon (Primary)        This was a 30 minute face-to-face visit with 30 minutes spent in  counseling and coordination of care as documented above.   The patient and I have reviewed their new cancer diagnosis and scheduled treatment plan. Needs assessment was completed including genetics, psychosocial needs, barriers to care, VAD evaluation, advanced care planning, and palliative care services. Referrals have been ordered as appropriate based upon our evaluation and patient desires.     Chemotherapy teaching was also completed today as documented above. Adequate time was given to answer all questions to her satisfaction. Patient and family are aware of their care team members and contact information if they have questions or problems throughout the treatment course. Needs assessments and education has been completed. The patient is adequately prepared to begin treatment as scheduled.     Tim Fraser reports a pain score of 0.  Given her pain assessment as noted, treatment options were discussed and the following options were decided upon as a follow-up plan to address the patient's pain:  no pain today .       PACO Shepard

## 2023-09-11 ENCOUNTER — TELEPHONE (OUTPATIENT)
Dept: NUTRITION | Facility: HOSPITAL | Age: 78
End: 2023-09-11
Payer: MEDICARE

## 2023-09-11 NOTE — PROGRESS NOTES
"Adult Outpatient Nutrition  Assessment    Patient Name:  Tim Fraser  YOB: 1945  MRN: [id number]    Assessment Date:  9/11/2023    CHART REVIEW  Tim Fraser  1945  78 y.o.  Wt: 129 lb  Ht: 63 in  Dx: colon cancer  Tx: oral chemo   (resection Aug 2023)  Labs: alb 3.5    PATIENT/FAMILY COMMENTS  Usual Body Weight: 140's 6-8 mon ago  Weight Comments: holding at present  Diet: regular (tolerating all foods)  Hydration: water  Food Allergies: nkfa  Appetite/Intake: \"better\"  Supplements: Ensure (does not like \"vitamin taste\"  Meals: 3   Food Preparation: pt   (daughter-in-laws avail to help)  Nutrition Concerns:  Lost 8% body wt  Pt feels needs to work on hydration  Lives alone  No problems with chewing/swallowing/nausea/vomiting/constipation/diarrhea.  Pt walking outside for exercise while we were talking.       GOAL(s)  Optimize nutrition in attempt to prevent further loss of LBM      EDUCATION  -High calorie high protein diet  -Supplementation        * Offered recipes          for homemade supplements   -Snack suggestions  -Importance of staying hydrated  -Food safety    To reinforce education, written information with RD's name & number mailed.  Pt kind and very receptive to suggestions--agreed to call on dietitian as needed.                    Electronically signed by:  Nannette Benz RD  09/11/23 09:40 CDT     "

## 2023-09-12 ENCOUNTER — TELEPHONE (OUTPATIENT)
Dept: ONCOLOGY | Facility: CLINIC | Age: 78
End: 2023-09-12
Payer: MEDICARE

## 2023-09-12 DIAGNOSIS — C18.4 MALIGNANT NEOPLASM OF TRANSVERSE COLON: Primary | ICD-10-CM

## 2023-09-12 NOTE — TELEPHONE ENCOUNTER
Pt called and stated received chemo pills yesterday. Made pt aware will see her at her appt tomorrow. Pt stated on schedule for blood to be drawn prior to appt and is that necessary. Made pt aware will ask Dr. Grover but pretty sure he will want labs drawn.

## 2023-09-13 ENCOUNTER — LAB (OUTPATIENT)
Dept: ONCOLOGY | Facility: HOSPITAL | Age: 78
End: 2023-09-13
Payer: MEDICARE

## 2023-09-13 ENCOUNTER — OFFICE VISIT (OUTPATIENT)
Dept: SURGERY | Facility: CLINIC | Age: 78
End: 2023-09-13
Payer: MEDICARE

## 2023-09-13 ENCOUNTER — OFFICE VISIT (OUTPATIENT)
Dept: ONCOLOGY | Facility: CLINIC | Age: 78
End: 2023-09-13
Payer: MEDICARE

## 2023-09-13 VITALS
SYSTOLIC BLOOD PRESSURE: 162 MMHG | BODY MASS INDEX: 22.85 KG/M2 | HEART RATE: 64 BPM | OXYGEN SATURATION: 100 % | WEIGHT: 129 LBS | RESPIRATION RATE: 18 BRPM | DIASTOLIC BLOOD PRESSURE: 70 MMHG

## 2023-09-13 VITALS
HEART RATE: 66 BPM | DIASTOLIC BLOOD PRESSURE: 74 MMHG | HEIGHT: 63 IN | SYSTOLIC BLOOD PRESSURE: 134 MMHG | BODY MASS INDEX: 22.86 KG/M2 | TEMPERATURE: 96.4 F | WEIGHT: 129 LBS

## 2023-09-13 DIAGNOSIS — C18.4 MALIGNANT NEOPLASM OF TRANSVERSE COLON: Primary | ICD-10-CM

## 2023-09-13 DIAGNOSIS — C18.4 MALIGNANT NEOPLASM OF TRANSVERSE COLON: ICD-10-CM

## 2023-09-13 DIAGNOSIS — I10 PRIMARY HYPERTENSION: ICD-10-CM

## 2023-09-13 LAB
ALBUMIN SERPL-MCNC: 4 G/DL (ref 3.5–5.2)
ALBUMIN/GLOB SERPL: 1.3 G/DL
ALP SERPL-CCNC: 66 U/L (ref 39–117)
ALT SERPL W P-5'-P-CCNC: 14 U/L (ref 1–33)
ANION GAP SERPL CALCULATED.3IONS-SCNC: 7 MMOL/L (ref 5–15)
AST SERPL-CCNC: 19 U/L (ref 1–32)
BASOPHILS # BLD AUTO: 0.03 10*3/MM3 (ref 0–0.2)
BASOPHILS NFR BLD AUTO: 0.7 % (ref 0–1.5)
BILIRUB SERPL-MCNC: 0.5 MG/DL (ref 0–1.2)
BUN SERPL-MCNC: 13 MG/DL (ref 8–23)
BUN/CREAT SERPL: 14.3 (ref 7–25)
CALCIUM SPEC-SCNC: 9.1 MG/DL (ref 8.6–10.5)
CHLORIDE SERPL-SCNC: 103 MMOL/L (ref 98–107)
CO2 SERPL-SCNC: 28 MMOL/L (ref 22–29)
CREAT SERPL-MCNC: 0.91 MG/DL (ref 0.57–1)
DEPRECATED RDW RBC AUTO: 40.8 FL (ref 37–54)
EGFRCR SERPLBLD CKD-EPI 2021: 64.7 ML/MIN/1.73
EOSINOPHIL # BLD AUTO: 0.05 10*3/MM3 (ref 0–0.4)
EOSINOPHIL NFR BLD AUTO: 1.1 % (ref 0.3–6.2)
ERYTHROCYTE [DISTWIDTH] IN BLOOD BY AUTOMATED COUNT: 13 % (ref 12.3–15.4)
GLOBULIN UR ELPH-MCNC: 3.2 GM/DL
GLUCOSE SERPL-MCNC: 111 MG/DL (ref 65–99)
HCT VFR BLD AUTO: 36.2 % (ref 34–46.6)
HGB BLD-MCNC: 11.3 G/DL (ref 12–15.9)
IMM GRANULOCYTES # BLD AUTO: 0.01 10*3/MM3 (ref 0–0.05)
IMM GRANULOCYTES NFR BLD AUTO: 0.2 % (ref 0–0.5)
LYMPHOCYTES # BLD AUTO: 1.14 10*3/MM3 (ref 0.7–3.1)
LYMPHOCYTES NFR BLD AUTO: 25.7 % (ref 19.6–45.3)
MCH RBC QN AUTO: 27 PG (ref 26.6–33)
MCHC RBC AUTO-ENTMCNC: 31.2 G/DL (ref 31.5–35.7)
MCV RBC AUTO: 86.6 FL (ref 79–97)
MONOCYTES # BLD AUTO: 0.59 10*3/MM3 (ref 0.1–0.9)
MONOCYTES NFR BLD AUTO: 13.3 % (ref 5–12)
NEUTROPHILS NFR BLD AUTO: 2.62 10*3/MM3 (ref 1.7–7)
NEUTROPHILS NFR BLD AUTO: 59 % (ref 42.7–76)
NRBC BLD AUTO-RTO: 0 /100 WBC (ref 0–0.2)
PLATELET # BLD AUTO: 355 10*3/MM3 (ref 140–450)
PMV BLD AUTO: 10.6 FL (ref 6–12)
POTASSIUM SERPL-SCNC: 4.4 MMOL/L (ref 3.5–5.2)
PROT SERPL-MCNC: 7.2 G/DL (ref 6–8.5)
RBC # BLD AUTO: 4.18 10*6/MM3 (ref 3.77–5.28)
SODIUM SERPL-SCNC: 138 MMOL/L (ref 136–145)
WBC NRBC COR # BLD: 4.44 10*3/MM3 (ref 3.4–10.8)

## 2023-09-13 PROCEDURE — 85025 COMPLETE CBC W/AUTO DIFF WBC: CPT

## 2023-09-13 PROCEDURE — G0463 HOSPITAL OUTPT CLINIC VISIT: HCPCS | Performed by: INTERNAL MEDICINE

## 2023-09-13 PROCEDURE — 99024 POSTOP FOLLOW-UP VISIT: CPT | Performed by: SURGERY

## 2023-09-13 PROCEDURE — 1159F MED LIST DOCD IN RCRD: CPT | Performed by: SURGERY

## 2023-09-13 PROCEDURE — 3078F DIAST BP <80 MM HG: CPT | Performed by: SURGERY

## 2023-09-13 PROCEDURE — 1160F RVW MEDS BY RX/DR IN RCRD: CPT | Performed by: SURGERY

## 2023-09-13 PROCEDURE — 3075F SYST BP GE 130 - 139MM HG: CPT | Performed by: SURGERY

## 2023-09-13 PROCEDURE — 80053 COMPREHEN METABOLIC PANEL: CPT

## 2023-09-13 RX ORDER — ATENOLOL 25 MG/1
25 TABLET ORAL 2 TIMES DAILY
COMMUNITY
Start: 2023-08-30

## 2023-09-13 NOTE — PROGRESS NOTES
78-year-old female who is now just over 3 weeks out from her left colon resection for a T3N1 colon cancer.  Patient has seen Dr. Grover and he has recommended oral chemotherapy.  She still has reservations about taking the chemo after reviewing all the side effects potentially.  Overall she is doing well.  She is tolerating a regular diet.  She has had some back pain she says that intermittently occurs when she eats.  She denies any pain anteriorly in her abdomen.  At time of surgery her gallbladder looked normal.  CT scan though does suggest that she has gallstones.  No history of pancreatitis no history of jaundice.  Clearly no right upper quadrant or anterior abdominal pain.  We provided her with a pamphlet about gallbladder disease and she will review that we will consider options if symptoms become worse.  She is going to see Dr. Grover from medical oncology later today and encouraged her to ask him any questions that she may have about the chemo.  She is very healthy for age and she can go either way with the chemo.  Her incision is well-healed she had good support in her abdominal wall her abdomen is soft.  She will follow-up with us in 2 months or sooner if she has any other concerns or questions

## 2023-09-13 NOTE — PROGRESS NOTES
"  Subjective     Tim Fraser was seen in follow-up for colon cancer.  We discussed chemotherapy regimen and side effects at length.  Risk versus benefit discussed.  She understood and was agreeable.      Past Medical History, Past Surgical History, Social History, Family History have been reviewed and are without significant changes except as mentioned.      Medications:  The current medication list was reviewed in the EMR    ALLERGIES:    Allergies   Allergen Reactions    Codeine Other (See Comments)     \"Causes chest pressure\"    Doxycycline Rash    Latex Rash    Lisinopril Cough       Objective      Vitals:    09/13/23 1025   BP: 162/70   Pulse: 64   Resp: 18   SpO2: 100%              No data to display                Physical Exam  Vitals and nursing note reviewed.   Constitutional:       Appearance: Normal appearance.   Neurological:      General: No focal deficit present.      Mental Status: She is alert and oriented to person, place, and time. Mental status is at baseline.   Psychiatric:         Mood and Affect: Mood normal.         Behavior: Behavior normal.         Thought Content: Thought content normal.             RECENT LABS:Independently reviewed and summarized  Hematology WBC   Date Value Ref Range Status   08/23/2023 10.95 (H) 3.40 - 10.80 10*3/mm3 Final   08/14/2019 4.6 4.0 - 11.0 10*3/uL Final     RBC   Date Value Ref Range Status   08/23/2023 3.63 (L) 3.77 - 5.28 10*6/mm3 Final   08/14/2019 4.72 4.15 - 4.87 10*6/uL Final     Hemoglobin   Date Value Ref Range Status   08/23/2023 9.9 (L) 12.0 - 15.9 g/dL Final   08/14/2019 13.6 12.7 - 14.7 g/dL Final     Hematocrit   Date Value Ref Range Status   08/23/2023 30.7 (L) 34.0 - 46.6 % Final   08/14/2019 42.3 37.9 - 43.9 % Final     Platelets   Date Value Ref Range Status   08/23/2023 281 140 - 450 10*3/mm3 Final   08/14/2019 236 150 - 450 10*3/uL Final     WBC   Date Value Ref Range Status   09/13/2023 4.44 3.40 - 10.80 10*3/mm3 Final "   08/14/2019 4.6 4.0 - 11.0 10*3/uL Final     RBC   Date Value Ref Range Status   09/13/2023 4.18 3.77 - 5.28 10*6/mm3 Final   08/14/2019 4.72 4.15 - 4.87 10*6/uL Final     Hemoglobin   Date Value Ref Range Status   09/13/2023 11.3 (L) 12.0 - 15.9 g/dL Final   08/14/2019 13.6 12.7 - 14.7 g/dL Final     Hematocrit   Date Value Ref Range Status   09/13/2023 36.2 34.0 - 46.6 % Final   08/14/2019 42.3 37.9 - 43.9 % Final     MCV   Date Value Ref Range Status   09/13/2023 86.6 79.0 - 97.0 fL Final   08/14/2019 90 85 - 95 fl Final     MCH   Date Value Ref Range Status   09/13/2023 27.0 26.6 - 33.0 pg Final   08/14/2019 28.8 28.0 - 32.0 pg Final     MCHC   Date Value Ref Range Status   09/13/2023 31.2 (L) 31.5 - 35.7 g/dL Final   08/14/2019 32.2 32.0 - 34.0 g/dL Final     RDW   Date Value Ref Range Status   09/13/2023 13.0 12.3 - 15.4 % Final   08/14/2019 42.2 37 - 54 fl Final     RDW-SD   Date Value Ref Range Status   09/13/2023 40.8 37.0 - 54.0 fl Final     MPV   Date Value Ref Range Status   09/13/2023 10.6 6.0 - 12.0 fL Final   08/14/2019 11.4 8.0 - 12.0 fl Final     Platelets   Date Value Ref Range Status   09/13/2023 355 140 - 450 10*3/mm3 Final   08/14/2019 236 150 - 450 10*3/uL Final     Neutrophil %   Date Value Ref Range Status   09/13/2023 59.0 42.7 - 76.0 % Final     Lymphocyte Rel %   Date Value Ref Range Status   08/14/2019 34.9 5 - 55 % Final     Lymphocyte %   Date Value Ref Range Status   09/13/2023 25.7 19.6 - 45.3 % Final     Monocyte Rel %   Date Value Ref Range Status   08/14/2019 8.1 (H) 3 - 8 % Final     Monocyte %   Date Value Ref Range Status   09/13/2023 13.3 (H) 5.0 - 12.0 % Final     Eosinophil Rel %   Date Value Ref Range Status   08/14/2019 1.3 0 - 5 % Final     Eosinophil %   Date Value Ref Range Status   09/13/2023 1.1 0.3 - 6.2 % Final     Basophil Rel %   Date Value Ref Range Status   08/14/2019 0.9 0 - 2 % Final     Basophil %   Date Value Ref Range Status   09/13/2023 0.7 0.0 - 1.5 %  Final     Immature Grans %   Date Value Ref Range Status   09/13/2023 0.2 0.0 - 0.5 % Final   08/14/2019 54.4 45 - 80 % Final     Neutrophils, Absolute   Date Value Ref Range Status   09/13/2023 2.62 1.70 - 7.00 10*3/mm3 Final     Lymphocytes, Absolute   Date Value Ref Range Status   09/13/2023 1.14 0.70 - 3.10 10*3/mm3 Final     Monocytes, Absolute   Date Value Ref Range Status   09/13/2023 0.59 0.10 - 0.90 10*3/mm3 Final     Eosinophils, Absolute   Date Value Ref Range Status   09/13/2023 0.05 0.00 - 0.40 10*3/mm3 Final     Basophils, Absolute   Date Value Ref Range Status   09/13/2023 0.03 0.00 - 0.20 10*3/mm3 Final     Immature Grans, Absolute   Date Value Ref Range Status   09/13/2023 0.01 0.00 - 0.05 10*3/mm3 Final     nRBC   Date Value Ref Range Status   09/13/2023 0.0 0.0 - 0.2 /100 WBC Final     Lab Results   Component Value Date    GLUCOSE 128 (H) 08/23/2023    BUN 6 (L) 08/23/2023    CREATININE 0.88 08/23/2023    EGFR 67.4 08/23/2023    BCR 6.8 (L) 08/23/2023    K 4.2 08/23/2023    CO2 21.0 (L) 08/23/2023    CALCIUM 7.9 (L) 08/23/2023    ALBUMIN 3.5 08/21/2023    BILITOT 0.3 08/21/2023    AST 12 08/21/2023    ALT 9 08/21/2023              Tim Fraser reports a pain score of 0.  Given her pain assessment as noted, treatment options were discussed and the following options were decided upon as a follow-up plan to address the patient's pain: continuation of current treatment plan for pain.    Patient screened negative for depression based on a PHQ-9 score of 0 on 9/13/2023.     Advance Care Planning   ACP discussion was declined by the patient. Patient does not have an advance directive, declines further assistance.         Diagnosis:   (1) Adenocarcinoma of colon, transverse colon   Stage IIIB (pT3, pN1b, cM0)   MSI stable.      Date of diagnosis: 8/8/23     Prior therapy:   Colon resection (8/22/23)      Current therapy:   Adjuvant Xeloda (9/13/23)        (2) Hypertension     Assessment & Plan      (1) Adenocarcinoma of colon, transverse colon     Patient with stage IIIb colon cancer that was resected surgery.  She is rather healthy 78-year-old female with minimal comorbidities and good overall performance status.  After lengthy discussion with patient and family about risk versus benefit shared decision was made to consider 6 months of adjuvant Xeloda.  Low threshold for discontinuing chemotherapy if significant side effects.    Side effects associated with Xeloda discussed at length.  Risk versus benefit discussed.  She understood and was agreeable.  She will start Xeloda tomorrow.  She is taking 1500 mg Xeloda twice a day.  She will take Xeloda for 2 weeks followed by 1 week break.  Labs look stable for cycle 1.    She will see Hannah in my absence in 2 weeks with CBC, CMP.  If no significant side effects we will continue Xeloda at 1500 mg twice a day dose -14 days on, 7 days off.  I will see her back in 5 weeks with CBC, CMP prior to cycle 3.    (2) Hypertension     Chronic, stable  Patient needs to be monitored closely on chemotherapy which could cause worsening blood pressure control.  Continue current antihypertensive medications.  Closely monitor blood pressure in clinic  and at home.  Report to us if any high or low blood pressure.  Counseled and educated.      9/13/2023      CC:

## 2023-09-14 RX ORDER — CAPECITABINE 500 MG/1
TABLET, FILM COATED ORAL
Qty: 84 TABLET | Refills: 6 | Status: SHIPPED | OUTPATIENT
Start: 2023-09-14

## 2023-09-14 NOTE — TELEPHONE ENCOUNTER
Rx Refill Note  Requested Prescriptions     Pending Prescriptions Disp Refills    capecitabine (XELODA) 500 MG chemo tablet [Pharmacy Med Name: CAPECITABINE TAB 500MG]       Sig: Take 3 tablets (1500 mg) by mouth every AM and 3 tablets (1500 mg) by mouth every PM for 14 days, then take 7 days off, then repeat cycle. Use as directed by physician.  Take doses approximately 12 hours apart at the end of a meal.      Last office visit with prescribing clinician: 9/13/2023   Last telemedicine visit with prescribing clinician: Visit date not found   Next office visit with prescribing clinician: 10/18/2023                         Would you like a call back once the refill request has been completed: [] Yes [] No    If the office needs to give you a call back, can they leave a voicemail: [] Yes [] No    Alli Alvarez Rep  09/14/23, 10:58 CDT

## 2023-09-27 ENCOUNTER — SPECIALTY PHARMACY (OUTPATIENT)
Dept: ONCOLOGY | Facility: CLINIC | Age: 78
End: 2023-09-27
Payer: MEDICARE

## 2023-09-27 ENCOUNTER — LAB (OUTPATIENT)
Dept: ONCOLOGY | Facility: HOSPITAL | Age: 78
End: 2023-09-27
Payer: MEDICARE

## 2023-09-27 ENCOUNTER — DOCUMENTATION (OUTPATIENT)
Dept: ONCOLOGY | Facility: CLINIC | Age: 78
End: 2023-09-27
Payer: MEDICARE

## 2023-09-27 ENCOUNTER — OFFICE VISIT (OUTPATIENT)
Dept: ONCOLOGY | Facility: CLINIC | Age: 78
End: 2023-09-27
Payer: MEDICARE

## 2023-09-27 VITALS
SYSTOLIC BLOOD PRESSURE: 200 MMHG | RESPIRATION RATE: 18 BRPM | DIASTOLIC BLOOD PRESSURE: 90 MMHG | BODY MASS INDEX: 22.85 KG/M2 | OXYGEN SATURATION: 100 % | WEIGHT: 129 LBS | HEART RATE: 59 BPM

## 2023-09-27 DIAGNOSIS — C18.4 MALIGNANT NEOPLASM OF TRANSVERSE COLON: ICD-10-CM

## 2023-09-27 DIAGNOSIS — C18.4 MALIGNANT NEOPLASM OF TRANSVERSE COLON: Primary | ICD-10-CM

## 2023-09-27 LAB
ALBUMIN SERPL-MCNC: 3.8 G/DL (ref 3.5–5.2)
ALBUMIN/GLOB SERPL: 1.5 G/DL
ALP SERPL-CCNC: 52 U/L (ref 39–117)
ALT SERPL W P-5'-P-CCNC: 9 U/L (ref 1–33)
ANION GAP SERPL CALCULATED.3IONS-SCNC: 8 MMOL/L (ref 5–15)
AST SERPL-CCNC: 14 U/L (ref 1–32)
BASOPHILS # BLD AUTO: 0.03 10*3/MM3 (ref 0–0.2)
BASOPHILS NFR BLD AUTO: 0.7 % (ref 0–1.5)
BILIRUB SERPL-MCNC: 0.7 MG/DL (ref 0–1.2)
BUN SERPL-MCNC: 14 MG/DL (ref 8–23)
BUN/CREAT SERPL: 16.1 (ref 7–25)
CALCIUM SPEC-SCNC: 8.6 MG/DL (ref 8.6–10.5)
CHLORIDE SERPL-SCNC: 100 MMOL/L (ref 98–107)
CO2 SERPL-SCNC: 27 MMOL/L (ref 22–29)
CREAT SERPL-MCNC: 0.87 MG/DL (ref 0.57–1)
DEPRECATED RDW RBC AUTO: 39.8 FL (ref 37–54)
EGFRCR SERPLBLD CKD-EPI 2021: 68.3 ML/MIN/1.73
EOSINOPHIL # BLD AUTO: 0.02 10*3/MM3 (ref 0–0.4)
EOSINOPHIL NFR BLD AUTO: 0.5 % (ref 0.3–6.2)
ERYTHROCYTE [DISTWIDTH] IN BLOOD BY AUTOMATED COUNT: 13.4 % (ref 12.3–15.4)
GLOBULIN UR ELPH-MCNC: 2.6 GM/DL
GLUCOSE SERPL-MCNC: 99 MG/DL (ref 65–99)
HCT VFR BLD AUTO: 35.7 % (ref 34–46.6)
HGB BLD-MCNC: 11.2 G/DL (ref 12–15.9)
HOLD SPECIMEN: NORMAL
IMM GRANULOCYTES # BLD AUTO: 0.01 10*3/MM3 (ref 0–0.05)
IMM GRANULOCYTES NFR BLD AUTO: 0.2 % (ref 0–0.5)
LYMPHOCYTES # BLD AUTO: 1.16 10*3/MM3 (ref 0.7–3.1)
LYMPHOCYTES NFR BLD AUTO: 28.8 % (ref 19.6–45.3)
MCH RBC QN AUTO: 27 PG (ref 26.6–33)
MCHC RBC AUTO-ENTMCNC: 31.4 G/DL (ref 31.5–35.7)
MCV RBC AUTO: 86 FL (ref 79–97)
MONOCYTES # BLD AUTO: 0.38 10*3/MM3 (ref 0.1–0.9)
MONOCYTES NFR BLD AUTO: 9.4 % (ref 5–12)
NEUTROPHILS NFR BLD AUTO: 2.43 10*3/MM3 (ref 1.7–7)
NEUTROPHILS NFR BLD AUTO: 60.4 % (ref 42.7–76)
NRBC BLD AUTO-RTO: 0 /100 WBC (ref 0–0.2)
PLATELET # BLD AUTO: 266 10*3/MM3 (ref 140–450)
PMV BLD AUTO: 10.4 FL (ref 6–12)
POTASSIUM SERPL-SCNC: 4 MMOL/L (ref 3.5–5.2)
PROT SERPL-MCNC: 6.4 G/DL (ref 6–8.5)
RBC # BLD AUTO: 4.15 10*6/MM3 (ref 3.77–5.28)
SODIUM SERPL-SCNC: 135 MMOL/L (ref 136–145)
WBC NRBC COR # BLD: 4.03 10*3/MM3 (ref 3.4–10.8)

## 2023-09-27 PROCEDURE — 3077F SYST BP >= 140 MM HG: CPT | Performed by: NURSE PRACTITIONER

## 2023-09-27 PROCEDURE — 3080F DIAST BP >= 90 MM HG: CPT | Performed by: NURSE PRACTITIONER

## 2023-09-27 PROCEDURE — 80053 COMPREHEN METABOLIC PANEL: CPT

## 2023-09-27 PROCEDURE — 85025 COMPLETE CBC W/AUTO DIFF WBC: CPT

## 2023-09-27 PROCEDURE — 1126F AMNT PAIN NOTED NONE PRSNT: CPT | Performed by: NURSE PRACTITIONER

## 2023-09-27 PROCEDURE — 99214 OFFICE O/P EST MOD 30 MIN: CPT | Performed by: NURSE PRACTITIONER

## 2023-09-27 PROCEDURE — G0463 HOSPITAL OUTPT CLINIC VISIT: HCPCS | Performed by: INTERNAL MEDICINE

## 2023-09-27 PROCEDURE — 36415 COLL VENOUS BLD VENIPUNCTURE: CPT

## 2023-09-27 RX ORDER — CAPECITABINE 500 MG/1
1500 TABLET, FILM COATED ORAL 2 TIMES DAILY
Qty: 84 TABLET | Refills: 0 | Status: SHIPPED | OUTPATIENT
Start: 2023-09-27

## 2023-09-27 RX ORDER — CAPECITABINE 500 MG/1
TABLET, FILM COATED ORAL
Refills: 1 | Status: CANCELLED | OUTPATIENT
Start: 2023-10-05 | End: 2023-10-18

## 2023-09-27 NOTE — PROGRESS NOTES
Pt will be receiving xeloda from [facility] on 09/29. Pt would like for the specialty pharmacy team here at [facility] to coordinate her refills with [facility].

## 2023-09-28 NOTE — PROGRESS NOTES
"DATE OF VISIT: 9/27/2023      REASON FOR VISIT:  colon cancer Stage IIIB; currently on adjuvant Xeloda      HISTORY OF PRESENT ILLNESS:   78 yr old female pt being seen today in Dr. Grover absence  She has a Stage III B colon cancer and has been started on adjuvant Xeloda.  She has completed one full cycle and is will be starting his 7 days off on 9/28/2023  She denies any diarrhea or nausea; states her hands and feet are slightly red but no redness or sores.      Past Medical History, Past Surgical History, Social History, Family History have been reviewed and are without significant changes except as mentioned.    Review of Systems   A comprehensive 14 point review of systems was performed and was negative except as mentioned.    Medications:  The current medication list was reviewed in the EMR    ALLERGIES:    Allergies   Allergen Reactions    Codeine Other (See Comments)     \"Causes chest pressure\"    Doxycycline Rash    Latex Rash    Lisinopril Cough       Objective      Vitals:    09/27/23 1016   BP: (!) 200/90  Comment: manual   Pulse: 59   Resp: 18   SpO2: 100%   Weight: 58.5 kg (129 lb)   PainSc: 0-No pain          No data to display                Physical Exam  General: alert and oriented no acute distress  Lungs;normal breath sounds  Card:RRR  Ext; hands and feet examined no significant erythema noted.    RECENT LABS:  Glucose   Date Value Ref Range Status   09/27/2023 99 65 - 99 mg/dL Final     Sodium   Date Value Ref Range Status   09/27/2023 135 (L) 136 - 145 mmol/L Final   08/10/2023 136 136 - 145 mmol/L Final     Potassium   Date Value Ref Range Status   09/27/2023 4.0 3.5 - 5.2 mmol/L Final   08/10/2023 4.2 3.5 - 5.1 mmol/L Final     CO2   Date Value Ref Range Status   09/27/2023 27.0 22.0 - 29.0 mmol/L Final     Total CO2   Date Value Ref Range Status   08/10/2023 26 21 - 31 mmol/L Final     Chloride   Date Value Ref Range Status   09/27/2023 100 98 - 107 mmol/L Final   08/10/2023 102 98 - 107 " mmol/L Final     Anion Gap   Date Value Ref Range Status   09/27/2023 8.0 5.0 - 15.0 mmol/L Final   08/10/2023 8 4 - 12 mmol/L Final     Creatinine   Date Value Ref Range Status   09/27/2023 0.87 0.57 - 1.00 mg/dL Final   08/10/2023 1.0 0.7 - 1.3 mg/dL Final     BUN   Date Value Ref Range Status   09/27/2023 14 8 - 23 mg/dL Final   08/10/2023 9 7 - 25 mg/dL Final     BUN/Creatinine Ratio   Date Value Ref Range Status   09/27/2023 16.1 7.0 - 25.0 Final     Calcium   Date Value Ref Range Status   09/27/2023 8.6 8.6 - 10.5 mg/dL Final   08/10/2023 8.9 8.6 - 10.3 mg/dL Final     Alkaline Phosphatase   Date Value Ref Range Status   09/27/2023 52 39 - 117 U/L Final   08/10/2023 54 34 - 104 U/L Final   08/14/2019 61 46 - 116 U/L Final     Total Protein   Date Value Ref Range Status   09/27/2023 6.4 6.0 - 8.5 g/dL Final   08/14/2019 6.8 6.4 - 8.2 g/dL Final     ALT (SGPT)   Date Value Ref Range Status   09/27/2023 9 1 - 33 U/L Final   08/10/2023 10 7 - 52 U/L Final   08/14/2019 23 14 - 59 U/L Final     AST (SGOT)   Date Value Ref Range Status   09/27/2023 14 1 - 32 U/L Final   08/10/2023 15 13 - 39 U/L Final   08/14/2019 15 15 - 37 U/L Final     Total Bilirubin   Date Value Ref Range Status   09/27/2023 0.7 0.0 - 1.2 mg/dL Final   08/10/2023 0.59 0.3 - 1.0 mg/dL Final     Albumin   Date Value Ref Range Status   09/27/2023 3.8 3.5 - 5.2 g/dL Final   08/10/2023 3.9 3.5 - 5.7 g/dL Final     Globulin   Date Value Ref Range Status   09/27/2023 2.6 gm/dL Final     Lab Results   Component Value Date    WBC 4.03 09/27/2023    HGB 11.2 (L) 09/27/2023    HCT 35.7 09/27/2023    MCV 86.0 09/27/2023     09/27/2023     Lab Results   Component Value Date    NEUTROABS 2.43 09/27/2023    IRON 63 07/21/2021    IRON 67 08/07/2020    EWXTDIWJ51 3,420 (H) 07/24/2023    PKQCBLZM17 3,396 (H) 07/21/2021     Lab Results   Component Value Date    CEA 5.16 (H) 08/10/2023    REFLABREPO  08/22/2023     [facility]  [address] [place] [address]  Phone: [phone] or [phone]  Fax: [phone]  Brigido Alvarez M.D., Medical Director    PATIENT NAME                           LABORATORY NO.  ROSEMARY CHURCH.                  [id number]  [id number]                         AGE              SEX  SSN           CLIENT REF #  [facility]           78      1945  F    [id number]   [id number]    [facility]                       REQUESTING M.D.     ATTENDING MAlanD.     COPY TO[address] NADIA     [place], [place] [address]             DATE COLLECTED      DATE RECEIVED      DATE REPORTED  2023    DIAGNOSIS:  COLON, RESECTION, SPLENIC FLEXURE, DISTAL TRANSVERSE, &  PROXIMAL DESCENDING WITH LYMPH NODES:  Invasive moderately differentiated adenocarcinoma, pT3, pN1b, see CAP  checklist.  Margins negative for carcinoma.  Two out of fourteen lymph nodes positive for metastatic carcinoma (2/14).  One tumor deposit identified.    COLON AND RECTUM    SPECIMEN:  Procedure: Left hemicolectomy    TUMOR:  Tumor Site: Transverse colon  Histologic Type: Adenocarcinoma  Histologic Grade: G2, moderately differentiated  Tumor Size: 6.1 cm  Tumor Extent:  Invades through muscularis propria into the pericolonic or  perirectal tissue  Macroscopic Tumor Perforation: Not identified  Lymphovascular Invasion: Small vessel  Perineural Invasion: Not identified  Treatment Effect: No known presurgical therapy    MARGINS:  Margin Status for Invasive Carcinoma:  - All margins negative for invasive carcinoma  Closest Margin(s) to Invasive Carcinoma: Radial (circumferential) or  mesenteric; Mesenteric  Distance from Invasive Carcinoma to Closest Margin: 6.0 cm  Margin Status for Non-Invasive Tumor: All margins negative for high-grade  dysplasia / intramucosal carcinoma and low-grade dysplasia    REGIONAL LYMPH NODES:  Regional Lymph  Node Status:  - Tumor present in regional lymph node(s)  Number of Lymph Nodes with Tumor: 2  Number of Lymph Nodes Examined: 14  Tumor Deposits:  - Present  Number of Tumor Deposits: 1    PATHOLOGIC STAGE CLASSIFICATION (pTNM, AJCC 8th Edition):  Reporting of pT, pN, and (when applicable) pM categories is based on  information available to the pathologist at the time the report is issued. As per  the AJCC (Chapter 1, 8th Ed.) it is the managing physician-s responsibility to  establish the final pathologic stage based upon all pertinent information,  including but potentially not limited to this pathology report.  pT Category: pT3  pN Category: pN1b      CLINICAL HISTORY:  Malignant neoplasm of transverse colon    SPECIMENS RECEIVED:  COLON, RESECTION, SPLENIC FLEXURE, DISTAL TRANSVERSE, &  PROXIMAL DESCENDING WITH LYMPH NODES    FROZEN SECTION INTERPRETATION:  Frozen section performed by Dr. Swain at [facility] (refer to  above for address).  Frozen section diagnosis:  8 cm from nearest resection  margin.  Start time:  9:15.  Finish time:  9:25.  Total time:  10 minutes.  Frozen  section stains are acceptable.  The patient ID is verified and a verbal report was  given to Dr. Bateman by Dr. Swain.    MICROSCOPIC DESCRIPTION:  A11:  CK AE1/AE3 - Highlights small metastatic deposit of tumor within the lymph  node.    The internal and external (both positive and negative) controls reacted  appropriately. Some of our immunohistochemical and in situ hybridization  studies are performed as analyte specific reagents. The following statement  applies to those tests: This test was developed, and its performance  characteristics determined by [facility]. It has not been  cleared or approved by the US Food and Drug Administration. However, the  FDA has determined that approval and clearance are not necessary.    Professional interpretation rendered by Buck Swain M.D. at [facility] [facility], [address] [place], [place] [id number].    GROSS DESCRIPTION:  Received in formalin labeled \"splenic flexure, distal transverse and proximal  descending colon with lymph nodes\", is a previously opened and inked oriented  portion of colon which is received with an abundant amount of mesocolonic  adipose and 1 stapled/one open margin.  The colon is 20.1 cm in length by 2.5  cm in average diameter and has a pink-tan, glistening, minimally visible serosal  surface.  The colon is significant for a 6.1 x 3.5 x 0.9 circumferential, fungating  mass that is located 7.0 cm from the stapled margin and 11.5 cm from the open  margin.  The serosa and adipose overlying this mass is previously inked blue.  The closest mesenteric margin is inked orange.  Further sectioning reveals the  previously mentioned mass having a pink-white, friable cut surfaces with a gross  depth of invasion of 0.5 cm.  This mass grossly abuts the serosal surface and is  located 6.0 cm from the closest mesenteric margin.  The bowel wall thickness  ranges from 0.3-0.6 cm.  The remaining mucosa is yellow-tan, normally folded  and unremarkable.  Sectioning through the mesocolonic adipose reveals 14  candidate nodes which range from 0.3-2.2 cm.  Sectioning of the largest nodes  reveals white, homogenous and glistening cut surfaces.  Representative sections  are submitted as follows:  A1: Stapled margin, en face  A2: Open margin, en face  A3: Closest mesenteric margin, en face  A4: Mass to serosa  A5: Mass to demonstrate greatest gross depth of invasion  A6: Mass to uninvolved  A7-A8: Largest candidate node  A9: 1 candidate node, bisected  A10: 1 candidate node, bisected  A11: 1 candidate node, bisected  A12: 1 candidate node, bisected  A13: 1 candidate node, bisected  A 14: 5 intact candidate nodes  A 15: 3 intact candidate nodes  MLA    REVIEWED, DIAGNOSED AND ELECTRONICALLY  SIGNED BY:    Buck Swain M.D.  CPT CODES:  15745, [id number]   " "            RADIOLOGY DATA :  No radiology results for the last 7 days        Assessment & Plan       (1) Adenocarcinoma of colon, transverse colon      Patient with stage IIIb colon cancer that was resected surgery.  It was recommended that she start adjuvant Xeloda; she has completed her first cycle and will begin her 7 day \"off\" period on 9/28/2023.  -labs reviewed today and unremarkable.   -She has an appt on 10/18/2023 with Dr. Grover and labs; she will keep this appt and she will begin cycle #2 Xeloda on 10/5/2023.  -new prescription for Xeloda sent to pharmacy today.    (2) Hypertension      Chronic, stable  -patient states her BP does not run high at home  -encouraged her to continue to monitor closely and she will bring her at home cuff in at her next visit and will compare readings to our BP monitor.               PHQ-9 Total Score: 0 pt is not suicidal or  homicidal    Tim Lopezoscar Fraser reports a pain score of 0.  Given her pain assessment as noted, treatment options were discussed and the following options were decided upon as a follow-up plan to address the patient's pain:  no pain today .         Hannah Fernandez, APRN  9/28/2023  11:32 CDT        Part of this note may be an electronic transcription/translation of spoken language to printed text using the Dragon Dictation System.          CC:          "

## 2023-09-29 NOTE — PROGRESS NOTES
Oncology SW met and introduced self to patient as she presents for visit with APRN.    SW unable to meet face to face with pt until this day.  Pt. Diagnosed with  colon cancer Stage III  and currently on adjuvant Xeloda.    LCSW introduced self and explained role and support to include scope of clinical practice in oncology setting.     Pt. describes adequate resources to include home, transportation, insurance, food security and positive support system.    No barriers to care reported or identified.  Patient voiced no immediate concerns to SW.  No referrals for acute intervention identified or required from this encounter.  LCSW will follow up per advisement and/or as needs arise as pt proceeds with treatment.

## (undated) DEVICE — GLV SURG SENSICARE PI PF LF 7 GRN STRL

## (undated) DEVICE — ADHS LIQ MASTISOL 2/3ML

## (undated) DEVICE — TOWEL,OR,DSP,ST,BLUE,DLX,8/PK,10PK/CS: Brand: MEDLINE

## (undated) DEVICE — ENSEAL X1 TISSUE SEALER, CURVED JAW, 37 CM SHAFT LENGTH: Brand: ENSEAL

## (undated) DEVICE — PREVENA PEEL & PLACE SYSTEM KIT- 13 CM: Brand: PREVENA™ PEEL & PLACE™

## (undated) DEVICE — SUT PDS CLOSURE CT1 1/0 27IN Z341H

## (undated) DEVICE — STERILE POLYISOPRENE POWDER-FREE SURGICAL GLOVES WITH EMOLLIENT COATING: Brand: PROTEXIS

## (undated) DEVICE — LAPAROVUE VISIBILITY SYSTEM LAPAROSCOPIC SOLUTIONS: Brand: LAPAROVUE

## (undated) DEVICE — SUT MONOCRYL 4/0 PS2 27IN Y426H ETY426H

## (undated) DEVICE — SUT VIC 2/0 SH 27IN

## (undated) DEVICE — SOL IRR NACL 0.9PCT BT 1000ML

## (undated) DEVICE — BLUNT TIP TROCAR: Brand: AUTO SUTURE

## (undated) DEVICE — TOTAL TRAY, 16FR 10ML SIL FOLEY, URN: Brand: MEDLINE

## (undated) DEVICE — ADHS SKIN PREMIERPRO EXOFIN TOPICAL HI/VISC .5ML

## (undated) DEVICE — MONOPOLAR METZENBAUM SCISSOR TIP, DISPOSABLE: Brand: MONOPOLAR METZENBAUM SCISSOR TIP, DISPOSABLE

## (undated) DEVICE — PROXIMATE SKIN STAPLERS (35 WIDE) CONTAINS 35 STAINLESS STEEL STAPLES (FIXED HEAD): Brand: PROXIMATE

## (undated) DEVICE — SUT VIC 2/0 TIES 18IN J111T

## (undated) DEVICE — WOUND RETRACTOR AND PROTECTOR: Brand: ALEXIS O WOUND PROTECTOR-RETRACTOR

## (undated) DEVICE — SUT VICRYL 3-0 SH-1 PO 18IN J772D

## (undated) DEVICE — PATIENT RETURN ELECTRODE, SINGLE-USE, CONTACT QUALITY MONITORING, ADULT, WITH 9FT CORD, FOR PATIENTS WEIGING OVER 33LBS. (15KG): Brand: MEGADYNE

## (undated) DEVICE — SPNG LAP 18X18IN LF STRL PK/5

## (undated) DEVICE — TRAY,IRRIGATION,BULBSYRINGE,60ML,CSR,PVP: Brand: MEDLINE

## (undated) DEVICE — STERILE POLYISOPRENE POWDER-FREE SURGICAL GLOVES: Brand: PROTEXIS

## (undated) DEVICE — TBG PENCL TELESCP MEGADYNE SMOKE EVAC 10FT

## (undated) DEVICE — PK LAP CHOLE LF 60

## (undated) DEVICE — ENDOPATH XCEL DILATING TIP TROCARS WITH STABILITY SLEEVES: Brand: ENDOPATH XCEL

## (undated) DEVICE — LEGGINGS, PAIR, 31X48, STERILE: Brand: MEDLINE

## (undated) DEVICE — GLV SURG SENSICARE POLYISPRN W/ALOE PF LF 6.5 GRN STRL